# Patient Record
Sex: MALE | Race: WHITE | NOT HISPANIC OR LATINO | Employment: OTHER | RURAL
[De-identification: names, ages, dates, MRNs, and addresses within clinical notes are randomized per-mention and may not be internally consistent; named-entity substitution may affect disease eponyms.]

---

## 2023-06-13 RX ORDER — ATORVASTATIN CALCIUM 80 MG/1
TABLET, FILM COATED ORAL
Qty: 90 TABLET | OUTPATIENT
Start: 2023-06-13

## 2023-11-02 ENCOUNTER — OFFICE VISIT (OUTPATIENT)
Dept: CARDIOLOGY | Facility: CLINIC | Age: 68
End: 2023-11-02
Payer: MEDICARE

## 2023-11-02 VITALS
OXYGEN SATURATION: 95 % | WEIGHT: 225 LBS | HEIGHT: 69 IN | HEART RATE: 61 BPM | SYSTOLIC BLOOD PRESSURE: 124 MMHG | DIASTOLIC BLOOD PRESSURE: 82 MMHG | BODY MASS INDEX: 33.33 KG/M2

## 2023-11-02 DIAGNOSIS — R06.02 SHORTNESS OF BREATH: Primary | ICD-10-CM

## 2023-11-02 DIAGNOSIS — I10 ESSENTIAL HYPERTENSION: ICD-10-CM

## 2023-11-02 DIAGNOSIS — R91.8 LUNG NODULES: ICD-10-CM

## 2023-11-02 DIAGNOSIS — E78.5 HYPERLIPIDEMIA LDL GOAL <100: ICD-10-CM

## 2023-11-02 DIAGNOSIS — I51.7 MILD CONCENTRIC LEFT VENTRICULAR HYPERTROPHY (LVH): ICD-10-CM

## 2023-11-02 RX ORDER — ASPIRIN 81 MG/1
TABLET ORAL
COMMUNITY

## 2023-11-02 RX ORDER — PANTOPRAZOLE SODIUM 40 MG/1
TABLET, DELAYED RELEASE ORAL
COMMUNITY

## 2023-11-02 RX ORDER — CLOBETASOL PROPIONATE 0.5 MG/G
OINTMENT TOPICAL
COMMUNITY

## 2023-11-02 RX ORDER — METOPROLOL SUCCINATE AND HYDROCHLOROTHIAZIDE 12.5; 5 MG/1; MG/1
TABLET ORAL
COMMUNITY
End: 2023-11-02

## 2023-11-02 RX ORDER — MELOXICAM 15 MG/1
TABLET ORAL
COMMUNITY
Start: 2023-01-11

## 2023-11-02 RX ORDER — FLUTICASONE FUROATE, UMECLIDINIUM BROMIDE AND VILANTEROL TRIFENATATE 100; 62.5; 25 UG/1; UG/1; UG/1
POWDER RESPIRATORY (INHALATION)
COMMUNITY
Start: 2023-10-20

## 2023-11-02 RX ORDER — TRIAMTERENE AND HYDROCHLOROTHIAZIDE 37.5; 25 MG/1; MG/1
TABLET ORAL
COMMUNITY

## 2023-11-02 RX ORDER — SERTRALINE HYDROCHLORIDE 100 MG/1
TABLET, FILM COATED ORAL
COMMUNITY

## 2023-11-02 RX ORDER — ATORVASTATIN CALCIUM 40 MG/1
TABLET, FILM COATED ORAL
COMMUNITY

## 2023-11-02 RX ORDER — TAMSULOSIN HYDROCHLORIDE 0.4 MG/1
1 CAPSULE ORAL DAILY
COMMUNITY

## 2023-11-02 RX ORDER — ALBUTEROL SULFATE 90 UG/1
AEROSOL, METERED RESPIRATORY (INHALATION)
COMMUNITY

## 2023-11-02 NOTE — ASSESSMENT & PLAN NOTE
He describes shortness of breath only with exertional activity.  Exertional activities such as running after his dog or when ambulating up stairs greater than 1 flight of stairs.    He reports occasionally he will wheeze as he has a coexisting respiratory condition that he uses inhalers.    He reports that he is able to walk through the grocery store, push the buggy, load and unload his groceries and not feel short of air.  He reports that he is able to carry about a 10 to 15 pound weedeater, walk the edges of his yard and weed eat slow and steady for 15 or 20 minutes and not feel short of breath.    Suspect shortness of air may be several reasons that are contributing.  Discussed that he is carrying extra weight, that he is a bit deconditioned and has a coexisting respiratory condition that he uses inhalers.    Plan:  -Check an EKG  -Check an echocardiogram  -Check CT of the chest with contrast  -He is encouraged to slightly and slowly to increase his activity level.  He has a stationary bike that he uses in the past but has not been using regular.  He had taken the dog for walks several times a day in the past not been doing this as much lately.  So he is encouraged to slowly and slightly increase his activity and we will follow his symptoms.

## 2023-11-02 NOTE — PATIENT INSTRUCTIONS
Exercising to Lose Weight  Getting regular exercise is important for everyone. It is especially important if you are overweight. Being overweight increases your risk of heart disease, stroke, diabetes, high blood pressure, and several types of cancer. Exercising, and reducing the calories you consume, can help you lose weight and improve fitness and health.  Exercise can be moderate or vigorous intensity. To lose weight, most people need to do a certain amount of moderate or vigorous-intensity exercise each week.  How can exercise affect me?  You lose weight when you exercise enough to burn more calories than you eat. Exercise also reduces body fat and builds muscle. The more muscle you have, the more calories you burn. Exercise also:  Improves mood.  Reduces stress and tension.  Improves your overall fitness, flexibility, and endurance.  Increases bone strength.  Moderate-intensity exercise  A person riding a bicycle wearing a safety helmet.      Moderate-intensity exercise is any activity that gets you moving enough to burn at least three times more energy (calories) than if you were sitting.  Examples of moderate exercise include:  Walking a mile in 15 minutes.  Doing light yard work.  Biking at an easy pace.  Most people should get at least 150 minutes of moderate-intensity exercise a week to maintain their body weight.  Vigorous-intensity exercise  Vigorous-intensity exercise is any activity that gets you moving enough to burn at least six times more calories than if you were sitting. When you exercise at this intensity, you should be working hard enough that you are not able to carry on a conversation.  Examples of vigorous exercise include:  Running.  Playing a team sport, such as football, basketball, and soccer.  Jumping rope.  Most people should get at least 75 minutes a week of vigorous exercise to maintain their body weight.  What actions can I take to lose weight?  The amount of exercise you need to  lose weight depends on:  Your age.  The type of exercise.  Any health conditions you have.  Your overall physical ability.  Talk to your health care provider about how much exercise you need and what types of activities are safe for you.  Nutrition  A plate with healthy, colorful foods.      Make changes to your diet as told by your health care provider or diet and nutrition specialist (dietitian). This may include:  Eating fewer calories.  Eating more protein.  Eating less unhealthy fats.  Eating a diet that includes fresh fruits and vegetables, whole grains, low-fat dairy products, and lean protein.  Avoiding foods with added fat, salt, and sugar.  Drink plenty of water while you exercise to prevent dehydration or heat stroke.  Activity  Choose an activity that you enjoy and set realistic goals. Your health care provider can help you make an exercise plan that works for you.  Exercise at a moderate or vigorous intensity most days of the week.  The intensity of exercise may vary from person to person. You can tell how intense a workout is for you by paying attention to your breathing and heartbeat. Most people will notice their breathing and heartbeat get faster with more intense exercise.  Do resistance training twice each week, such as:  Push-ups.  Sit-ups.  Lifting weights.  Using resistance bands.  Getting short amounts of exercise can be just as helpful as long, structured periods of exercise. If you have trouble finding time to exercise, try doing these things as part of your daily routine:  Get up, stretch, and walk around every 30 minutes throughout the day.  Go for a walk during your lunch break.  Park your car farther away from your destination.  If you take public transportation, get off one stop early and walk the rest of the way.  Make phone calls while standing up and walking around.  Take the stairs instead of elevators or escalators.  Wear comfortable clothes and shoes with good support.  Do not  exercise so much that you hurt yourself, feel dizzy, or get very short of breath.  Where to find more information  [place] Department of Health and Human Services: www.hhs.gov  Centers for Disease Control and Prevention: www.cdc.gov  Contact a health care provider:  Before starting a new exercise program.  If you have questions or concerns about your weight.  If you have a medical problem that keeps you from exercising.  Get help right away if:  You have any of the following while exercising:  Injury.  Dizziness.  Difficulty breathing or shortness of breath that does not go away when you stop exercising.  Chest pain.  Rapid heartbeat.  These symptoms may represent a serious problem that is an emergency. Do not wait to see if the symptoms will go away. Get medical help right away. Call your local emergency services (911 in the [place] Do not drive yourself to the hospital.  Summary  Getting regular exercise is especially important if you are overweight.  Being overweight increases your risk of heart disease, stroke, diabetes, high blood pressure, and several types of cancer.  Losing weight happens when you burn more calories than you eat.  Reducing the amount of calories you eat, and getting regular moderate or vigorous exercise each week, helps you lose weight.  This information is not intended to replace advice given to you by your health care provider. Make sure you discuss any questions you have with your health care provider.  Document Revised: 02/13/2022 Document Reviewed: 02/13/2022  Elsevier Patient Education © 2022 [employer]    Healthy Eating  Following a healthy eating pattern may help you to achieve and maintain a healthy body weight, reduce the risk of chronic disease, and live a long and productive life. It is important to follow a healthy eating pattern at an appropriate calorie level for your body. Your nutritional needs should be met primarily through food by choosing a variety of nutrient-rich  "foods.  What are tips for following this plan?  Reading food labels  Read labels and choose the following:  Reduced or low sodium.  Juices with 100% fruit juice.  Foods with low saturated fats and high polyunsaturated and monounsaturated fats.  Foods with whole grains, such as whole wheat, cracked wheat, brown rice, and wild rice.  Whole grains that are fortified with folic acid. This is recommended for women who are pregnant or who want to become pregnant.  Read labels and avoid the following:  Foods with a lot of added sugars. These include foods that contain brown sugar, corn sweetener, corn syrup, dextrose, fructose, glucose, high-fructose corn syrup, honey, invert sugar, lactose, malt syrup, maltose, molasses, raw sugar, sucrose, trehalose, or turbinado sugar.  Do not eat more than the following amounts of added sugar per day:  6 teaspoons (25 g) for women.  9 teaspoons (38 g) for men.  Foods that contain processed or refined starches and grains.  Refined grain products, such as white flour, degermed cornmeal, white bread, and white rice.  Shopping  Choose nutrient-rich snacks, such as vegetables, whole fruits, and nuts. Avoid high-calorie and high-sugar snacks, such as potato chips, fruit snacks, and candy.  Use oil-based dressings and spreads on foods instead of solid fats such as butter, stick margarine, or cream cheese.  Limit pre-made sauces, mixes, and \"instant\" products such as flavored rice, instant noodles, and ready-made pasta.  Try more plant-protein sources, such as tofu, tempeh, black beans, edamame, lentils, nuts, and seeds.  Explore eating plans such as the Mediterranean diet or vegetarian diet.  Cooking  Use oil to sauté or stir-[place] foods instead of solid fats such as butter, stick margarine, or lard.  Try baking, boiling, grilling, or broiling instead of frying.  Remove the fatty part of meats before cooking.  Steam vegetables in water or broth.  Meal planning  A plate with examples of foods in " a healthy diet.      At meals, imagine dividing your plate into fourths:  One-half of your plate is fruits and vegetables.  One-fourth of your plate is whole grains.  One-fourth of your plate is protein, especially lean meats, poultry, eggs, tofu, beans, or nuts.  Include low-fat dairy as part of your daily diet.  Lifestyle  Choose healthy options in all settings, including home, work, school, restaurants, or stores.  Prepare your food safely:  Wash your hands after handling raw meats.  Keep food preparation surfaces clean by regularly washing with hot, soapy water.  Keep raw meats separate from ready-to-eat foods, such as fruits and vegetables.  , meat, poultry, and eggs to the recommended internal temperature.  Store foods at safe temperatures. In general:  Keep cold foods at 40°F (4.4°C) or below.  Keep hot foods at 140°F (60°C) or above.  Keep your freezer at 0°F (-17.8°C) or below.  Foods are no longer safe to eat when they have been between the temperatures of 40°-140°F (4.4-60°C) for more than 2 hours.  What foods should I eat?  Fruits  Aim to eat 2 cup-equivalents of fresh, canned (in natural juice), or frozen fruits each day. Examples of 1 cup-equivalent of fruit include 1 small apple, 8 large strawberries, 1 cup canned fruit, ½ cup dried fruit, or 1 cup 100% juice.  Vegetables  Aim to eat 2½-3 cup-equivalents of fresh and frozen vegetables each day, including different varieties and colors. Examples of 1 cup-equivalent of vegetables include 2 medium carrots, 2 cups raw, leafy greens, 1 cup chopped vegetable (raw or cooked), or 1 medium baked potato.  Grains  Aim to eat 6 ounce-equivalents of whole grains each day. Examples of 1 ounce-equivalent of grains include 1 slice of bread, 1 cup ready-to-eat cereal, 3 cups popcorn, or ½ cup cooked rice, pasta, or cereal.  Meats and other proteins  Aim to eat 5-6 ounce-equivalents of protein each day. Examples of 1 ounce-equivalent of protein include 1  egg, 1/2 cup nuts or seeds, or 1 tablespoon (16 g) peanut butter. A cut of meat or fish that is the size of a deck of cards is about 3-4 ounce-equivalents.  Of the protein you eat each week, try to have at least 8 ounces come from seafood. This includes salmon, trout, herring, and anchovies.  Dairy  Aim to eat 3 cup-equivalents of fat-free or low-fat dairy each day. Examples of 1 cup-equivalent of dairy include 1 cup (240 mL) milk, 8 ounces (250 g) yogurt, 1½ ounces (44 g) natural cheese, or 1 cup (240 mL) fortified soy milk.  Fats and oils  Aim for about 5 teaspoons (21 g) per day. Choose monounsaturated fats, such as canola and olive oils, avocados, peanut butter, and most nuts, or polyunsaturated fats, such as sunflower, corn, and soybean oils, walnuts, pine nuts, sesame seeds, sunflower seeds, and flaxseed.  Beverages  Aim for six 8-oz glasses of water per day. Limit coffee to three to five 8-oz cups per day.  Limit caffeinated beverages that have added calories, such as soda and energy drinks.  Limit alcohol intake to no more than 1 drink a day for nonpregnant women and 2 drinks a day for men. One drink equals 12 oz of beer (355 mL), 5 oz of wine (148 mL), or 1½ oz of hard liquor (44 mL).  Seasoning and other foods  Avoid adding excess amounts of salt to your foods. Try flavoring foods with herbs and spices instead of salt.  Avoid adding sugar to foods.  Try using oil-based dressings, sauces, and spreads instead of solid fats.  This information is based on general [place] nutrition guidelines. For more information, visit choosemyplate.gov. Exact amounts may vary based on your nutrition needs.  Summary  A healthy eating plan may help you to maintain a healthy weight, reduce the risk of chronic diseases, and stay active throughout your life.  Plan your meals. Make sure you eat the right portions of a variety of nutrient-rich foods.  Try baking, boiling, grilling, or broiling instead of frying.  Choose healthy  options in all settings, including home, work, school, restaurants, or stores.  This information is not intended to replace advice given to you by your health care provider. Make sure you discuss any questions you have with your health care provider.  Document Revised: 08/16/2022 Document Reviewed: 08/16/2022  Elsevier Patient Education © 2022 [employer]

## 2023-11-02 NOTE — ASSESSMENT & PLAN NOTE
He reports he was out of his cholesterol medications x2 months.    He completed labs in August 2023 showing his  triglycerides elevated.    He restarted statin in September.    Plan 3-month follow-up of lipids now on statin therapy.    Also discussed healthy diet for his triglycerides.  He is encouraged to lower his sugar and carbohydrate intake.

## 2023-11-02 NOTE — ASSESSMENT & PLAN NOTE
CT of the chest April 21, 2021 with lung nodules.  This was a stable follow-up CT of the chest.    He reports today he has had no follow-up since 2021.  He was due for annual follow-up on this.    Plan CT of the chest with contrast

## 2023-11-02 NOTE — ASSESSMENT & PLAN NOTE
Blood pressure is well controlled.    He does check his blood pressure at home with a wrist cuff.  He reports he normally sees blood pressures of systolic blood pressure 120-130.    He is on metoprolol 50 mg daily-noted heart rate 54 on EKG.  He is encouraged to check heart rate when he is up and active to make sure that is going up into the 70s and 80s.    He is also on triamterene hydrochlorothiazide.  37.5 to 25 mg daily.    Plan to continue this regimen.  May consider lowering his metoprolol dose depending on heart rate with exercise.

## 2023-11-02 NOTE — PROGRESS NOTES
[facility] Provider Note     Date:   2023   Name: Stalin Diaz  :   1955  PCP: Tan Payne MD    Chief Complaint   Patient presents with   • Coronary Artery Disease       Subjective     History of Present Illness  Stalin Diaz is a 68 y.o. male who presents today for 2-1/2-year follow-up.  He has been seen here in the past for known history of lung nodules, hypertension and hyperlipidemia.  He has had suspected CAD but has never completed a heart cath.    At today's visit he reports that overall he has been doing well and feeling well.  He reports that he did run out of his cholesterol medication and that his prompted him to make this follow-up visit.  He did see his family physician about 2 months ago completed his annual checkup and lab work there.    He reports that he is active as he walks his dog, mows his yard and uses his weedeater.  He reports if he ambulates up 1 set of stairs that he will be winded at the top but he reports his knees will hurt more than he will be short of air.  He reports that if he has to run after his dog then he will be short of air.  He reports if he has to ambulate up he will or upstairs he will have shortness of air.He reports he can use his weedeater and normally is not winded.    History of tenontitis and vertigo and this is related to his dizziness. Not changed and not new.    Reports Denies   Chest Pain [] [x]   Shortness of Air [x] []   Palpitations [] [x]   Edema [] [x]   Dizziness [x] []   Syncope [] [x]       No Known Allergies    Current Outpatient Medications:   •  albuterol sulfate HFA (ProAir HFA) 108 (90 Base) MCG/ACT inhaler, Inhale 2 puffs every 4 hours by inhalation route., Disp: , Rfl:   •  aspirin (ASPIR) 81 MG EC tablet, , Disp: , Rfl:   •  atorvastatin (LIPITOR) 40 MG tablet, Take 1 tablet every day by oral route., Disp: , Rfl:   •  clobetasol (Temovate) 0.05 % ointment, , Disp: , Rfl:   •  meloxicam (MOBIC)  "15 MG tablet, , Disp: , Rfl:   •  metoprolol succinate XL (TOPROL-XL) 50 MG 24 hr tablet, Take 1 tablet by mouth Daily., Disp: , Rfl:   •  pantoprazole (PROTONIX) 40 MG EC tablet, , Disp: , Rfl:   •  sertraline (ZOLOFT) 100 MG tablet, , Disp: , Rfl:   •  tamsulosin (FLOMAX) 0.4 MG capsule 24 hr capsule, Take 1 capsule by mouth Daily., Disp: , Rfl:   •  Trelegy Ellipta 100-62.5-25 MCG/ACT inhaler, , Disp: , Rfl:   •  triamterene-hydrochlorothiazide (MAXZIDE-25) 37.5-25 MG per tablet, , Disp: , Rfl:     Past Medical History:   Diagnosis Date   • Carotid artery stenosis    • Chronic kidney disease    • Coronary artery disease    • Hyperlipidemia    • Hypertension    • Retinal detachment    • SVT (supraventricular tachycardia)    • Thoracic aortic aneurysm       Past Surgical History:   Procedure Laterality Date   • CATARACT EXTRACTION     • KNEE SURGERY     • RETINAL DETACHMENT SURGERY       Family History   Problem Relation Age of Onset   • Heart attack Mother    • Lung cancer Mother    • Kidney failure Father    • Crohn's disease Sister    • Heart attack Brother      Social History     Socioeconomic History   • Marital status: Single   Tobacco Use   • Smoking status: Every Day     Packs/day: 1     Types: Cigarettes     Passive exposure: Current   • Smokeless tobacco: Never   Vaping Use   • Vaping Use: Never used   Substance and Sexual Activity   • Alcohol use: Not Currently   • Drug use: Never   • Sexual activity: Defer       Objective     Vital Signs:  /82   Pulse 61   Ht 175.3 cm (69\")   Wt 102 kg (225 lb)   SpO2 95%   BMI 33.23 kg/m²   Estimated body mass index is 33.23 kg/m² as calculated from the following:    Height as of this encounter: 175.3 cm (69\").    Weight as of this encounter: 102 kg (225 lb).       BMI is >= 30 and <35. (Class 1 Obesity). The following options were offered after discussion;: Information on healthy weight added to patient's after visit summary.      Physical " Exam  Constitutional:       Appearance: Normal appearance. He is well-developed.   HENT:      Head: Normocephalic and atraumatic.      Nose: Nose normal.      Mouth/Throat:      Mouth: Mucous membranes are moist.   Eyes:      General: No scleral icterus.     Pupils: Pupils are equal, round, and reactive to light.   Neck:      Vascular: No carotid bruit.   Cardiovascular:      Rate and Rhythm: Normal rate and regular rhythm.      Pulses: Normal pulses.           Radial pulses are 2+ on the right side and 2+ on the left side.        Dorsalis pedis pulses are 2+ on the right side and 2+ on the left side.        Posterior tibial pulses are 2+ on the right side and 2+ on the left side.      Heart sounds: Normal heart sounds. No murmur heard.  Pulmonary:      Effort: Pulmonary effort is normal.      Breath sounds: Normal breath sounds. No wheezing or rhonchi.   Abdominal:      General: Bowel sounds are normal.   Musculoskeletal:      Right lower leg: No edema.      Left lower leg: No edema.   Skin:     General: Skin is warm and dry.      Capillary Refill: Capillary refill takes less than 2 seconds.      Coloration: Skin is not cyanotic.      Nails: There is no clubbing.   Neurological:      Mental Status: He is alert and oriented to person, place, and time.      Motor: No weakness.      Gait: Gait normal.   Psychiatric:         Mood and Affect: Mood normal.         Behavior: Behavior normal. Behavior is cooperative.         Thought Content: Thought content normal.         Cognition and Memory: Memory normal.           Labs reviewed: 8/23/2023 CBC, CMP, TSH, hemoglobin A1c, lipids, PSA and 8/23/2023 office note, Medicare annual wellness visit with family physician Dr. Payne.      ECG 12 Lead    Date/Time: 11/2/2023 11:19 AM  Performed by: Irais Marion APRN    Authorized by: Irais Marion APRN  Comparison: compared with previous ECG from 5/13/2020  Similar to previous ECG  Rhythm: sinus bradycardia  Rate:  bradycardic  BPM: 54  Conduction: non-specific intraventricular conduction delay  Other findings: low voltage and left ventricular hypertrophy    Clinical impression: non-specific ECG           Assessment and Plan     Diagnoses and all orders for this visit:    1. Shortness of breath (Primary)  Assessment & Plan:  He describes shortness of breath only with exertional activity.  Exertional activities such as running after his dog or when ambulating up stairs greater than 1 flight of stairs.    He reports occasionally he will wheeze as he has a coexisting respiratory condition that he uses inhalers.    He reports that he is able to walk through the grocery store, push the buggy, load and unload his groceries and not feel short of air.  He reports that he is able to carry about a 10 to 15 pound weedeater, walk the edges of his yard and weed eat slow and steady for 15 or 20 minutes and not feel short of breath.    Suspect shortness of air may be several reasons that are contributing.  Discussed that he is carrying extra weight, that he is a bit deconditioned and has a coexisting respiratory condition that he uses inhalers.    Plan:  -Check an EKG  -Check an echocardiogram  -Check CT of the chest with contrast  -He is encouraged to slightly and slowly to increase his activity level.  He has a stationary bike that he uses in the past but has not been using regular.  He had taken the dog for walks several times a day in the past not been doing this as much lately.  So he is encouraged to slowly and slightly increase his activity and we will follow his symptoms.    Orders:  -     Adult Transthoracic Echo Complete W/ Cont if Necessary Per Protocol; Future    2. Lung nodules  Assessment & Plan:  CT of the chest April 21, 2021 with lung nodules.  This was a stable follow-up CT of the chest.    He reports today he has had no follow-up since 2021.  He was due for annual follow-up on this.    Plan CT of the chest with  contrast    Orders:  -     CT Chest With Contrast Diagnostic; Future    3. Hyperlipidemia LDL goal <100  Assessment & Plan:  He reports he was out of his cholesterol medications x2 months.    He completed labs in August 2023 showing his  triglycerides elevated.    He restarted statin in September.    Plan 3-month follow-up of lipids now on statin therapy.    Also discussed healthy diet for his triglycerides.  He is encouraged to lower his sugar and carbohydrate intake.    Orders:  -     Lipid Panel; Future    4. Essential hypertension  Assessment & Plan:  Blood pressure is well controlled.    He does check his blood pressure at home with a wrist cuff.  He reports he normally sees blood pressures of systolic blood pressure 120-130.    He is on metoprolol 50 mg daily-noted heart rate 54 on EKG.  He is encouraged to check heart rate when he is up and active to make sure that is going up into the 70s and 80s.    He is also on triamterene hydrochlorothiazide.  37.5 to 25 mg daily.    Plan to continue this regimen.  May consider lowering his metoprolol dose depending on heart rate with exercise.    Orders:  -     Basic Metabolic Panel; Future  -     ECG 12 Lead    5. Mild concentric left ventricular hypertrophy (LVH)  -     Adult Transthoracic Echo Complete W/ Cont if Necessary Per Protocol; Future        Recommendations: Report if any new/changing symptoms immediately, Exercise recommendations discussed, and healthy diet including lowering sugars and lowering carbohydrates discussed today.    Stalin Diaz  reports that he has been smoking cigarettes. He has been smoking an average of 1 pack per day. He has been exposed to tobacco smoke. He has never used smokeless tobacco.. I have educated him on the risk of diseases from using tobacco products such as cancer, COPD, and heart disease.     I advised him to quit and he is not willing to quit.    I spent 3  minutes counseling the patient.           Follow  Up  Return in about 6 weeks (around 12/14/2023) for CT of chest results and lab results .  Patient was given instructions and counseling regarding his condition or for health maintenance advice. Please see specific information pulled into the AVS if appropriate.

## 2023-11-10 DIAGNOSIS — R91.8 LUNG NODULES: ICD-10-CM

## 2023-12-14 ENCOUNTER — OFFICE VISIT (OUTPATIENT)
Dept: CARDIOLOGY | Facility: CLINIC | Age: 68
End: 2023-12-14
Payer: MEDICARE

## 2023-12-14 VITALS
HEART RATE: 80 BPM | SYSTOLIC BLOOD PRESSURE: 124 MMHG | BODY MASS INDEX: 32.44 KG/M2 | DIASTOLIC BLOOD PRESSURE: 68 MMHG | OXYGEN SATURATION: 95 % | WEIGHT: 219 LBS | HEIGHT: 69 IN

## 2023-12-14 DIAGNOSIS — I51.7 MILD CONCENTRIC LEFT VENTRICULAR HYPERTROPHY (LVH): Primary | ICD-10-CM

## 2023-12-14 DIAGNOSIS — R06.02 SHORTNESS OF BREATH: ICD-10-CM

## 2023-12-14 DIAGNOSIS — I10 ESSENTIAL HYPERTENSION: ICD-10-CM

## 2023-12-14 DIAGNOSIS — R91.8 LUNG NODULES: ICD-10-CM

## 2023-12-14 DIAGNOSIS — E78.5 HYPERLIPIDEMIA LDL GOAL <100: ICD-10-CM

## 2023-12-14 RX ORDER — FINASTERIDE 5 MG/1
1 TABLET, FILM COATED ORAL DAILY
COMMUNITY
Start: 2023-12-07

## 2023-12-14 NOTE — ASSESSMENT & PLAN NOTE
Patient reports he has shortness of breath when climbing stairs or when he has to rambo after his dog.  Echo shows normal heart function and normal right ventricular pressures, left ventricular diastolic function is consistent with grade 1 impaired relaxation.  Will continue triamterene-hydrochlorothiazide.

## 2023-12-14 NOTE — ASSESSMENT & PLAN NOTE
Blood pressure is well-controlled on current medications.   Will continue current medications.   Limit salt intake.    And increase exercise as tolerated

## 2023-12-14 NOTE — ASSESSMENT & PLAN NOTE
Patient has since restarted his atorvastatin labs were reviewed from 12/14/2023 which showed improved cholesterol at 161, LDL 72 ,HDL 35, and triglycerides 161.

## 2023-12-14 NOTE — PROGRESS NOTES
[facility] Provider Note     Date:   2023  Name: Stalin Diaz  :   1955  PCP: Tan Payne MD    Chief Complaint   Patient presents with    Shortness of Breath       Subjective     History of Present Illness  Stalin Diaz is a 68 y.o. male who presents today for SOA.    Patient is here today for follow-up on shortness of air and echo results.  He states he he has noticed he is a little more winded when he runs after the dog or goes up a flight of stairs.  He had recent CT of chest which showed stable pulmonary nodule.  Echo results has been reviewed with patient as described below.    Labs were reviewed from this morning his BUN and creatinine 19/1.4, cholesterol is 139, triglycerides 161, HDL 35, and LDL 72.  He says this is improved since he has started back on his atorvastatin 40 mg daily.      Cardiac history:    Stress testing that was low risk    20 ECHO  LV EF 55-60%.  Normal LV size.  Mild LVH.  Mild Aortic valve sclerosis without stenosis. Trace AR. Mild MR.  Diastolic function is abnormal.  Right ventricular systolic pressure is normal.  Trace CT.  Aortic root is dilated, limited to the  sinuses of valsalva measuring up to 39mm.    Coexisting tobacco use, hyperlipidemia and hypertension.    History of lung nodule Recent CT of Chest shows stable pulmonay nodule.    23 ECHO ·  Left ventricular systolic function is normal. Left ventricular ejection fraction appears to be 56 - 60%.  ·  The left ventricular cavity is borderline dilated.  ·  Left ventricular diastolic function is consistent with (grade I) impaired relaxation.  ·  The right ventricular cavity is borderline dilated.  ·  The left atrial cavity is mildly dilated.  ·  There is mild calcification of the aortic valve mainly affecting the non-coronary and left coronary cusp(s).  ·  Estimated right ventricular systolic pressure from tricuspid regurgitation is normal (<35 mmHg).  ·  Mild  dilation of the aortic root is present. Mild dilation of the sinuses of Valsalva is present, 3.9 cm.    History of tenontitis and vertigo and this is related to his dizziness. Not changed and not new.     Reports Denies   Chest Pain [] [x]   Shortness of Air [x] []   Palpitations [] [x]   Edema [] [x]   Dizziness [x] []   Syncope [] [x]           No Known Allergies    Current Outpatient Medications:     aspirin (ASPIR) 81 MG EC tablet, , Disp: , Rfl:     atorvastatin (LIPITOR) 40 MG tablet, Take 1 tablet every day by oral route., Disp: , Rfl:     finasteride (PROSCAR) 5 MG tablet, Take 1 tablet by mouth Daily., Disp: , Rfl:     meloxicam (MOBIC) 15 MG tablet, , Disp: , Rfl:     metoprolol succinate XL (TOPROL-XL) 50 MG 24 hr tablet, Take 1 tablet by mouth Daily., Disp: , Rfl:     pantoprazole (PROTONIX) 40 MG EC tablet, , Disp: , Rfl:     sertraline (ZOLOFT) 100 MG tablet, , Disp: , Rfl:     tamsulosin (FLOMAX) 0.4 MG capsule 24 hr capsule, Take 1 capsule by mouth Daily., Disp: , Rfl:     Trelegy Ellipta 100-62.5-25 MCG/ACT inhaler, , Disp: , Rfl:     triamterene-hydrochlorothiazide (MAXZIDE-25) 37.5-25 MG per tablet, , Disp: , Rfl:     Past Medical History:   Diagnosis Date    Carotid artery stenosis     Chronic kidney disease     Coronary artery disease     Hyperlipidemia     Hypertension     Retinal detachment     SVT (supraventricular tachycardia)     Thoracic aortic aneurysm       Past Surgical History:   Procedure Laterality Date    CATARACT EXTRACTION      KNEE SURGERY      RETINAL DETACHMENT SURGERY       Family History   Problem Relation Age of Onset    Heart attack Mother     Lung cancer Mother     Kidney failure Father     Crohn's disease Sister     Heart attack Brother      Social History     Socioeconomic History    Marital status: Single   Tobacco Use    Smoking status: Every Day     Packs/day: 1     Types: Cigarettes     Passive exposure: Current    Smokeless tobacco: Never   Vaping Use    Vaping Use:  "Never used   Substance and Sexual Activity    Alcohol use: Not Currently    Drug use: Never    Sexual activity: Defer       Objective     Vital Signs:  /68   Pulse 80   Ht 175.3 cm (69\")   Wt 99.3 kg (219 lb)   SpO2 95%   BMI 32.34 kg/m²   Estimated body mass index is 32.34 kg/m² as calculated from the following:    Height as of this encounter: 175.3 cm (69\").    Weight as of this encounter: 99.3 kg (219 lb).               Physical Exam  Constitutional:       Appearance: Normal appearance.   Cardiovascular:      Rate and Rhythm: Normal rate and regular rhythm.      Pulses: Normal pulses.      Heart sounds: Murmur heard.   Pulmonary:      Effort: Pulmonary effort is normal.      Breath sounds: Normal breath sounds and air entry.   Musculoskeletal:      Right lower leg: No edema.      Left lower leg: No edema.   Skin:     General: Skin is warm and dry.   Neurological:      General: No focal deficit present.      Mental Status: He is alert and oriented to person, place, and time.   Psychiatric:         Mood and Affect: Mood normal.         Behavior: Behavior normal.         Thought Content: Thought content normal.         The following data was reviewed by: KYRA Lima on 12/14/2023:    Labs and ECHO from 12/14/23.          Assessment and Plan     Diagnoses and all orders for this visit:    1. Mild concentric left ventricular hypertrophy (LVH) (Primary)  Assessment & Plan:  Echo reviewed from today shows borderline LVH.      2. Essential hypertension  Assessment & Plan:  Blood pressure is well-controlled on current medications.   Will continue current medications.   Limit salt intake.    And increase exercise as tolerated    Orders:  -     Basic Metabolic Panel    3. Hyperlipidemia LDL goal <100  Assessment & Plan:  Patient has since restarted his atorvastatin labs were reviewed from 12/14/2023 which showed improved cholesterol at 161, LDL 72 ,HDL 35, and triglycerides 161.    Orders:  -     Lipid " Panel    4. Shortness of breath  Assessment & Plan:  Patient reports he has shortness of breath when climbing stairs or when he has to rambo after his dog.  Echo shows normal heart function and normal right ventricular pressures, left ventricular diastolic function is consistent with grade 1 impaired relaxation.  Will continue triamterene-hydrochlorothiazide.      5. Lung nodules  Assessment & Plan:  Recent CT in November 2023 shows stable pulmonary nodule.          Recommendations: ER if symptoms increase and Report if any new/changing symptoms immediately          Follow Up  Return in about 1 year (around 12/14/2024) for Next scheduled follow up.  Patient was given instructions and counseling regarding his condition or for health maintenance advice. Please see specific information pulled into the AVS if appropriate.

## 2023-12-19 ENCOUNTER — TELEPHONE (OUTPATIENT)
Dept: CARDIOLOGY | Facility: CLINIC | Age: 68
End: 2023-12-19
Payer: MEDICARE

## 2023-12-19 NOTE — TELEPHONE ENCOUNTER
----- Message from Dinora Meyer MA sent at 12/19/2023  3:42 PM EST -----  Called patient, no answer. Left VM to call back.

## 2024-12-12 ENCOUNTER — OFFICE VISIT (OUTPATIENT)
Dept: CARDIOLOGY | Facility: CLINIC | Age: 69
End: 2024-12-12
Payer: MEDICARE

## 2024-12-12 VITALS
HEART RATE: 96 BPM | HEIGHT: 69 IN | BODY MASS INDEX: 33.62 KG/M2 | WEIGHT: 227 LBS | SYSTOLIC BLOOD PRESSURE: 138 MMHG | DIASTOLIC BLOOD PRESSURE: 82 MMHG | OXYGEN SATURATION: 95 %

## 2024-12-12 DIAGNOSIS — R06.02 SHORTNESS OF BREATH: Primary | ICD-10-CM

## 2024-12-12 DIAGNOSIS — I10 ESSENTIAL HYPERTENSION: ICD-10-CM

## 2024-12-12 DIAGNOSIS — E78.5 HYPERLIPIDEMIA LDL GOAL <100: ICD-10-CM

## 2024-12-12 PROCEDURE — 3079F DIAST BP 80-89 MM HG: CPT | Performed by: NURSE PRACTITIONER

## 2024-12-12 PROCEDURE — 3075F SYST BP GE 130 - 139MM HG: CPT | Performed by: NURSE PRACTITIONER

## 2024-12-12 PROCEDURE — 99214 OFFICE O/P EST MOD 30 MIN: CPT | Performed by: NURSE PRACTITIONER

## 2024-12-12 PROCEDURE — 93000 ELECTROCARDIOGRAM COMPLETE: CPT | Performed by: NURSE PRACTITIONER

## 2024-12-12 RX ORDER — TADALAFIL 5 MG/1
1 TABLET ORAL DAILY
COMMUNITY
Start: 2024-11-04

## 2024-12-12 RX ORDER — ATORVASTATIN CALCIUM 80 MG/1
80 TABLET, FILM COATED ORAL DAILY
COMMUNITY
Start: 2024-09-22

## 2024-12-12 RX ORDER — TRIAMTERENE AND HYDROCHLOROTHIAZIDE 37.5; 25 MG/1; MG/1
1 CAPSULE ORAL EVERY MORNING
COMMUNITY
Start: 2024-09-23

## 2024-12-12 NOTE — ASSESSMENT & PLAN NOTE
Patient reports that he will be winded when he push mows the yard, when he climbs stairs or when he chases after his dog.    He reports that this symptom has been persistent and possibly slightly increasing over the last 1 year.    Last echo shows heart function within normal limits.    CAD risk factors of hypertension and hyperlipidemia    Last stress testing was 2017    Plan:    Nuclear stress test for further evaluation and risk stratification for CAD.    He is unable to ambulate the treadmill due to a history of torn meniscus in the left knee and status post left knee surgery.    Plan follow-up after study to review the findings

## 2024-12-12 NOTE — PROGRESS NOTES
[facility] Provider Note     Date:   2024   Name: Stalin Diaz  :   1955  PCP: Tan Payne MD    Chief Complaint   Patient presents with   • Left ventricular hypertrophy     Pt here for annual follow up on LVH.  Last Echo .  Hx lung nodules with last CT .  Labs 24.  Denies chest pain, does have SOA maybe slightly worse with fatigue.       Subjective     History of Present Illness  Stalin Diaz is a 69 y.o. male who presents today for known history of LVH, hypertension and hyperlipidemia.  He reports that his shortness of air with exertional activity such as ambulating up stairs, chasing his dog and push mowing the yard has slightly increased.  He reports that he has fatigue.  He reports he can push mow the yard at a slow steady pace for about 30 minutes every 2 weeks.  He reports that he lets the dog out and place with the dog every day.    He denies chest pain.    He reports that he has been checking his blood pressure at home and the highest that he has seen is systolic blood pressure 141 time.  Reports his heart rate averages about 55.    Cardiac history:    Stress testing 2017 was low risk    ECHO 2023:  ·  Left ventricular systolic function is normal. Left ventricular ejection fraction appears to be 56 - 60%.  ·  Left ventricular diastolic function is consistent with (grade I) impaired relaxation.  ·  There is mild calcification of the aortic valve mainly affecting the non-coronary and left coronary cusp(s).  ·  Estimated right ventricular systolic pressure from tricuspid regurgitation is normal (<35 mmHg).  ·  Mild dilation of the aortic root is present. Mild dilation of the sinuses of Valsalva is present, 3.9 cm.      Coexisting:hyperlipidemia and hypertension.    History of lung nodule Recent CT of Chest;  2023: stable pulmonary nodule for > 2 years: no further follow up needed         Reports Denies   Chest Pain [] [x]    Shortness of Air [x] []   Palpitations [] [x]   Edema [] [x]   Dizziness [] [x]   Syncope [] [x]       No Known Allergies    Current Outpatient Medications:   •  aspirin (ASPIR) 81 MG EC tablet, , Disp: , Rfl:   •  atorvastatin (LIPITOR) 80 MG tablet, Take 1 tablet by mouth Daily., Disp: , Rfl:   •  finasteride (PROSCAR) 5 MG tablet, Take 1 tablet by mouth Daily., Disp: , Rfl:   •  meloxicam (MOBIC) 15 MG tablet, , Disp: , Rfl:   •  metoprolol succinate XL (TOPROL-XL) 50 MG 24 hr tablet, Take 1 tablet by mouth Daily., Disp: , Rfl:   •  pantoprazole (PROTONIX) 40 MG EC tablet, , Disp: , Rfl:   •  sertraline (ZOLOFT) 100 MG tablet, , Disp: , Rfl:   •  tadalafil (CIALIS) 5 MG tablet, Take 1 tablet by mouth Daily., Disp: , Rfl:   •  tamsulosin (FLOMAX) 0.4 MG capsule 24 hr capsule, Take 1 capsule by mouth Daily., Disp: , Rfl:   •  Trelegy Ellipta 100-62.5-25 MCG/ACT inhaler, , Disp: , Rfl:   •  triamterene-hydrochlorothiazide (DYAZIDE) 37.5-25 MG per capsule, Take 1 capsule by mouth Every Morning., Disp: , Rfl:     Past Medical History:   Diagnosis Date   • Carotid artery stenosis    • Chronic kidney disease    • Coronary artery disease    • Hyperlipidemia    • Hypertension    • Lung nodules    • Retinal detachment    • SVT (supraventricular tachycardia)    • Thoracic aortic aneurysm       Past Surgical History:   Procedure Laterality Date   • CATARACT EXTRACTION     • KNEE SURGERY     • RETINAL DETACHMENT SURGERY       Family History   Problem Relation Age of Onset   • Heart attack Mother    • Lung cancer Mother    • Kidney failure Father    • Crohn's disease Sister    • Heart attack Brother      Social History     Socioeconomic History   • Marital status:    • Number of children: 1   Tobacco Use   • Smoking status: Never     Passive exposure: Past   • Smokeless tobacco: Never   Vaping Use   • Vaping status: Never Used   Substance and Sexual Activity   • Alcohol use: Not Currently   • Drug use: Never   •  "Sexual activity: Defer       Objective     Vital Signs:  /82 (BP Location: Right arm, Patient Position: Sitting, Cuff Size: Adult)   Pulse 96   Ht 175.3 cm (69\")   Wt 103 kg (227 lb)   SpO2 95%   BMI 33.52 kg/m²   Estimated body mass index is 33.52 kg/m² as calculated from the following:    Height as of this encounter: 175.3 cm (69\").    Weight as of this encounter: 103 kg (227 lb).             Physical Exam  Constitutional:       Appearance: Normal appearance. He is well-developed.   HENT:      Head: Normocephalic and atraumatic.      Nose: Nose normal.      Mouth/Throat:      Mouth: Mucous membranes are moist.   Eyes:      General: No scleral icterus.     Pupils: Pupils are equal, round, and reactive to light.   Neck:      Vascular: No carotid bruit.   Cardiovascular:      Rate and Rhythm: Normal rate and regular rhythm.      Pulses: Normal pulses.           Radial pulses are 2+ on the right side and 2+ on the left side.        Dorsalis pedis pulses are 2+ on the right side and 2+ on the left side.        Posterior tibial pulses are 2+ on the right side and 2+ on the left side.      Heart sounds: Normal heart sounds. No murmur heard.  Pulmonary:      Effort: Pulmonary effort is normal.      Breath sounds: Normal breath sounds. No wheezing or rhonchi.   Abdominal:      General: Bowel sounds are normal.   Musculoskeletal:      Right lower leg: No edema.      Left lower leg: No edema.   Skin:     General: Skin is warm and dry.      Capillary Refill: Capillary refill takes less than 2 seconds.      Coloration: Skin is not cyanotic.      Nails: There is no clubbing.   Neurological:      Mental Status: He is alert and oriented to person, place, and time.      Motor: No weakness.      Gait: Gait normal.   Psychiatric:         Mood and Affect: Mood normal.         Behavior: Behavior normal. Behavior is cooperative.         Thought Content: Thought content normal.         Cognition and Memory: Memory normal. "           Labs reviewed: 8/26/2024 CBC PSA TSH CMP and lipid profile      ECG 12 Lead    Date/Time: 12/12/2024 6:31 PM  Performed by: Irais Marion APRN    Authorized by: Irais Marion APRN  Comparison: compared with previous ECG from 11/2/2023  Similar to previous ECG  Comparison to previous ECG: Sinus bradycardia heart rate 54 with conduction delay  Rhythm: sinus rhythm  Rate: normal  BPM: 61  Conduction: non-specific intraventricular conduction delay  ST Segments: ST segments normal  Other findings: T wave abnormality    Clinical impression: non-specific ECG           Assessment and Plan     Diagnoses and all orders for this visit:    1. Shortness of breath (Primary)  Assessment & Plan:  Patient reports that he will be winded when he push mows the yard, when he climbs stairs or when he chases after his dog.    He reports that this symptom has been persistent and possibly slightly increasing over the last 1 year.    Last echo shows heart function within normal limits.    CAD risk factors of hypertension and hyperlipidemia    Last stress testing was 2017    Plan:    Nuclear stress test for further evaluation and risk stratification for CAD.    He is unable to ambulate the treadmill due to a history of torn meniscus in the left knee and status post left knee surgery.    Plan follow-up after study to review the findings    Orders:  -     Stress Test With Myocardial Perfusion One Day; Future  -     ECG 12 Lead    2. Essential hypertension  Assessment & Plan:  Pressure today 138/82.  This is somewhat borderline control.    He is currently on:    Metoprolol succinate 50 mg daily  Triamterene hydrochlorothiazide 37.5/25 mg daily    Plan:   - recheck blood pressure at short follow-up visit  -Low-sodium diet    Noted weight is up about 8 pounds in 1 year.  He is advised weight loss.    Orders:  -     ECG 12 Lead    3. Hyperlipidemia LDL goal <100  Assessment & Plan:  Labs completed 8/26/2024 show  cholesterol levels:  -Total cholesterol 153  -HDL 41  -LDL 78  -Triglycerides 170    He is on atorvastatin 80 mg.    Plan:    Continue heart healthy diet    Exercise    Continue atorvastatin          Recommendations: Report if any new/changing symptoms immediately and Limitations of stress testing for definitive diagnosis reviewed          Follow Up  Return for Follow-Up after studies.  Patient was given instructions and counseling regarding his condition or for health maintenance advice. Please see specific information pulled into the AVS if appropriate.

## 2024-12-12 NOTE — ASSESSMENT & PLAN NOTE
Pressure today 138/82.  This is somewhat borderline control.    He is currently on:    Metoprolol succinate 50 mg daily  Triamterene hydrochlorothiazide 37.5/25 mg daily    Plan:   - recheck blood pressure at short follow-up visit  -Low-sodium diet    Noted weight is up about 8 pounds in 1 year.  He is advised weight loss.

## 2024-12-12 NOTE — ASSESSMENT & PLAN NOTE
Labs completed 8/26/2024 show cholesterol levels:  -Total cholesterol 153  -HDL 41  -LDL 78  -Triglycerides 170    He is on atorvastatin 80 mg.    Plan:    Continue heart healthy diet    Exercise    Continue atorvastatin

## 2024-12-13 ENCOUNTER — PATIENT ROUNDING (BHMG ONLY) (OUTPATIENT)
Dept: CARDIOLOGY | Facility: CLINIC | Age: 69
End: 2024-12-13
Payer: MEDICARE

## 2024-12-13 NOTE — PROGRESS NOTES
..My name is Rachel Lr and I am the Practice Manager for [facility] [place].    I would like to thank you for being a loyal patient. If you do not mind I would like to ask you a few questions about your recent visit with us.  Please feel free to reply if you wish to provide us with feedback on your first visit with our practice.    First, could you tell me what went well with your recent visit?    Secondly, we are always looking for ways to make our patients' experiences even better.  Do you have any recommendations on ways we may improve?    Finally, overall were you satisfied with your first visit to us as a [facility] facility?    In the next few days, you will be receiving a Patient Experience Survey.      Thank you for taking the time to answer a few questions today.  I hope you have a good day.

## 2024-12-17 ENCOUNTER — OUTSIDE FACILITY SERVICE (OUTPATIENT)
Dept: CARDIOLOGY | Facility: CLINIC | Age: 69
End: 2024-12-17
Payer: MEDICARE

## 2024-12-17 DIAGNOSIS — R06.02 SHORTNESS OF BREATH: ICD-10-CM

## 2024-12-19 ENCOUNTER — TELEPHONE (OUTPATIENT)
Age: 69
End: 2024-12-19
Payer: MEDICARE

## 2024-12-19 ENCOUNTER — OFFICE VISIT (OUTPATIENT)
Dept: CARDIOLOGY | Facility: CLINIC | Age: 69
End: 2024-12-19
Payer: MEDICARE

## 2024-12-19 VITALS
DIASTOLIC BLOOD PRESSURE: 72 MMHG | HEART RATE: 73 BPM | OXYGEN SATURATION: 96 % | WEIGHT: 230 LBS | SYSTOLIC BLOOD PRESSURE: 128 MMHG | BODY MASS INDEX: 34.07 KG/M2 | HEIGHT: 69 IN

## 2024-12-19 DIAGNOSIS — R94.39 ABNORMAL NUCLEAR STRESS TEST: Primary | ICD-10-CM

## 2024-12-19 DIAGNOSIS — I10 ESSENTIAL HYPERTENSION: ICD-10-CM

## 2024-12-19 DIAGNOSIS — R06.02 SHORTNESS OF BREATH: ICD-10-CM

## 2024-12-19 PROCEDURE — 3074F SYST BP LT 130 MM HG: CPT | Performed by: NURSE PRACTITIONER

## 2024-12-19 PROCEDURE — 3078F DIAST BP <80 MM HG: CPT | Performed by: NURSE PRACTITIONER

## 2024-12-19 RX ORDER — METOPROLOL TARTRATE 25 MG/1
200 TABLET, FILM COATED ORAL ONCE
OUTPATIENT
Start: 2024-12-19 | End: 2024-12-19

## 2024-12-19 RX ORDER — SODIUM CHLORIDE 0.9 % (FLUSH) 0.9 %
10 SYRINGE (ML) INJECTION EVERY 12 HOURS SCHEDULED
Status: CANCELLED | OUTPATIENT
Start: 2024-12-19

## 2024-12-19 RX ORDER — SODIUM CHLORIDE 0.9 % (FLUSH) 0.9 %
10 SYRINGE (ML) INJECTION AS NEEDED
Status: CANCELLED | OUTPATIENT
Start: 2024-12-19

## 2024-12-19 RX ORDER — METOPROLOL TARTRATE 25 MG/1
150 TABLET, FILM COATED ORAL ONCE
Status: CANCELLED | OUTPATIENT
Start: 2024-12-19

## 2024-12-19 RX ORDER — METOPROLOL TARTRATE 1 MG/ML
5 INJECTION, SOLUTION INTRAVENOUS
OUTPATIENT
Start: 2024-12-19

## 2024-12-19 RX ORDER — SODIUM CHLORIDE 0.9 % (FLUSH) 0.9 %
10 SYRINGE (ML) INJECTION AS NEEDED
OUTPATIENT
Start: 2024-12-19

## 2024-12-19 RX ORDER — METOPROLOL TARTRATE 25 MG/1
50 TABLET, FILM COATED ORAL ONCE
OUTPATIENT
Start: 2024-12-19

## 2024-12-19 RX ORDER — METOPROLOL TARTRATE 25 MG/1
150 TABLET, FILM COATED ORAL ONCE
OUTPATIENT
Start: 2024-12-19

## 2024-12-19 RX ORDER — METOPROLOL TARTRATE 25 MG/1
100 TABLET, FILM COATED ORAL ONCE
Status: CANCELLED | OUTPATIENT
Start: 2024-12-19

## 2024-12-19 RX ORDER — SODIUM CHLORIDE 9 MG/ML
40 INJECTION, SOLUTION INTRAVENOUS AS NEEDED
OUTPATIENT
Start: 2024-12-19

## 2024-12-19 RX ORDER — NITROGLYCERIN 0.4 MG/1
0.4 TABLET SUBLINGUAL
Status: CANCELLED | OUTPATIENT
Start: 2024-12-19 | End: 2024-12-19

## 2024-12-19 RX ORDER — METOPROLOL TARTRATE 50 MG
50 TABLET ORAL
OUTPATIENT
Start: 2024-12-19

## 2024-12-19 RX ORDER — NITROGLYCERIN 0.4 MG/1
0.4 TABLET SUBLINGUAL
OUTPATIENT
Start: 2024-12-19 | End: 2024-12-19

## 2024-12-19 RX ORDER — METOPROLOL TARTRATE 25 MG/1
100 TABLET, FILM COATED ORAL ONCE
OUTPATIENT
Start: 2024-12-19

## 2024-12-19 RX ORDER — SODIUM CHLORIDE 0.9 % (FLUSH) 0.9 %
10 SYRINGE (ML) INJECTION EVERY 12 HOURS SCHEDULED
OUTPATIENT
Start: 2024-12-19

## 2024-12-19 RX ORDER — METOPROLOL TARTRATE 50 MG
50 TABLET ORAL
Status: CANCELLED | OUTPATIENT
Start: 2024-12-19

## 2024-12-19 RX ORDER — NITROGLYCERIN 0.4 MG/1
0.8 TABLET SUBLINGUAL
Status: CANCELLED | OUTPATIENT
Start: 2024-12-19

## 2024-12-19 RX ORDER — NITROGLYCERIN 0.4 MG/1
0.8 TABLET SUBLINGUAL
OUTPATIENT
Start: 2024-12-19

## 2024-12-19 RX ORDER — METOPROLOL TARTRATE 1 MG/ML
5 INJECTION, SOLUTION INTRAVENOUS
Status: CANCELLED | OUTPATIENT
Start: 2024-12-19

## 2024-12-19 RX ORDER — CLOBETASOL PROPIONATE 0.5 MG/G
1 CREAM TOPICAL AS NEEDED
COMMUNITY
Start: 2024-12-19

## 2024-12-19 RX ORDER — METOPROLOL TARTRATE 25 MG/1
200 TABLET, FILM COATED ORAL ONCE
Status: CANCELLED | OUTPATIENT
Start: 2024-12-19 | End: 2024-12-19

## 2024-12-19 RX ORDER — METOPROLOL TARTRATE 25 MG/1
25 TABLET, FILM COATED ORAL ONCE
Status: CANCELLED | OUTPATIENT
Start: 2024-12-19

## 2024-12-19 RX ORDER — SODIUM CHLORIDE 9 MG/ML
40 INJECTION, SOLUTION INTRAVENOUS AS NEEDED
Status: CANCELLED | OUTPATIENT
Start: 2024-12-19

## 2024-12-19 RX ORDER — IVABRADINE 5 MG/1
15 TABLET, FILM COATED ORAL ONCE
OUTPATIENT
Start: 2024-12-19 | End: 2024-12-19

## 2024-12-19 RX ORDER — METOPROLOL TARTRATE 50 MG
TABLET ORAL
Qty: 2 TABLET | Refills: 0 | Status: SHIPPED | OUTPATIENT
Start: 2024-12-19

## 2024-12-19 RX ORDER — IVABRADINE 5 MG/1
15 TABLET, FILM COATED ORAL ONCE
Status: CANCELLED | OUTPATIENT
Start: 2024-12-19 | End: 2024-12-19

## 2024-12-19 RX ORDER — METOPROLOL TARTRATE 25 MG/1
25 TABLET, FILM COATED ORAL ONCE
OUTPATIENT
Start: 2024-12-19

## 2024-12-19 RX ORDER — METOPROLOL TARTRATE 50 MG
TABLET ORAL
Qty: 2 TABLET | Refills: 0 | Status: CANCELLED | OUTPATIENT
Start: 2024-12-19

## 2024-12-19 RX ORDER — METOPROLOL TARTRATE 25 MG/1
50 TABLET, FILM COATED ORAL ONCE
Status: CANCELLED | OUTPATIENT
Start: 2024-12-19

## 2024-12-19 NOTE — ASSESSMENT & PLAN NOTE
He reports that he will be winded when he push mows the yard and when he climbs the stairs and rambo after his dog.    Reports that the symptoms have been persistent and possibly slightly increased over the last 1 year.    Echo shows EF within normal limits.    CAD risk factors of hypertension and hyperlipidemia.    He has completed nuclear stress testing for further evaluation and restratification for CAD.    Stress test is reviewed in detail with patient at today's visit today showing:  -EKG sinus rhythm with nonspecific ST-T wave abnormality  -EF 55% with stress  -Moderate-sized area of inferior ischemia noted with wall motion abnormality  -Intermediate risk study    Plan:    Further evaluation with a coronary CTA    Follow-up after study to review the findings

## 2024-12-19 NOTE — TELEPHONE ENCOUNTER
----- Message from Irais Marion sent at 12/19/2024 11:19 AM EST -----  Regarding: hold med for procedure  He has medication Cialis that he cannot take with the nitro that he will get for the Coronary CTA. He needs to be off Cialis for atleast 48 hours before Nitro dose and for atleast 48 hours after the nitro dose. So lets hold Cialis 3 days before the study date and 3 days after the study date to be safe.

## 2024-12-19 NOTE — ASSESSMENT & PLAN NOTE
Stress test is reviewed in detail with patient at today's visit today showing:  -EKG sinus rhythm with nonspecific ST-T wave abnormality  -EF 55% with stress  -Moderate-sized area of inferior ischemia noted with wall motion abnormality  -Intermediate risk study    He has symptom of shortness of air that has had a slight increase over the last year with exertional activity    He has coexisting CAD risk factors of hypertension and hyperlipidemia    We discussed limitations of stress testing for restratification.    Plan:    Further evaluation with coronary CTA    Follow-up after study to review the findings

## 2024-12-19 NOTE — ASSESSMENT & PLAN NOTE
Pressure today 128/72.  This is well-controlled.    At his last visit he was asked to check his blood pressure at home.    Home blood pressure readings are as follows 125/83 heart rate 63, 132/83 heart rate 62, 134/75 heart rate 64.    He is currently on metoprolol succinate 50 mg daily and triamterene hydrochlorothiazide 37.5/25 mg daily.    Blood pressure is well-controlled.    Continue current blood pressure medication regimen.

## 2024-12-19 NOTE — PROGRESS NOTES
[facility] Provider Note     Date:   2024   Name: Stalin Diaz  :   1955  PCP: Tan Payne MD    Chief Complaint   Patient presents with    Shortness of Breath       Subjective     History of Present Illness  Stalin Diaz is a 69 y.o. male who presents today for follow-up on shortness of air.  He has had a slight increase in shortness of air with exertional activity over the last year.    He reports symptoms are the same since his last visit and have not increased or worsened.    He has completed a nuclear stress test and he wished to review the findings at today's visit.    Cardiac history:    Stress testing 2024 - Moderate size area of inferior ischemia note with wall motion abnormalities- Intermediate risk study     ECHO 2023:  ·  Left ventricular systolic function is normal. Left ventricular ejection fraction appears to be 56 - 60%.  ·  Left ventricular diastolic function is consistent with (grade I) impaired relaxation.  ·  There is mild calcification of the aortic valve mainly affecting the non-coronary and left coronary cusp(s).  ·  Estimated right ventricular systolic pressure from tricuspid regurgitation is normal (<35 mmHg).  ·  Mild dilation of the aortic root is present. Mild dilation of the sinuses of Valsalva is present, 3.9 cm.      Coexisting:hyperlipidemia and hypertension.    History of lung nodule Recent CT of Chest;  2023: stable pulmonary nodule for > 2 years: no further follow up needed         Reports Denies   Chest Pain [] [x]   Shortness of Air [x] []   Palpitations [] [x]   Edema [] [x]   Dizziness [] [x]   Syncope [] [x]       No Known Allergies    Current Outpatient Medications:     aspirin (ASPIR) 81 MG EC tablet, , Disp: , Rfl:     atorvastatin (LIPITOR) 80 MG tablet, Take 1 tablet by mouth Daily., Disp: , Rfl:     clobetasol propionate (TEMOVATE) 0.05 % cream, Apply 1 Application topically to the appropriate area  as directed As Needed., Disp: , Rfl:     finasteride (PROSCAR) 5 MG tablet, Take 1 tablet by mouth Daily., Disp: , Rfl:     meloxicam (MOBIC) 15 MG tablet, , Disp: , Rfl:     metoprolol succinate XL (TOPROL-XL) 50 MG 24 hr tablet, Take 1 tablet by mouth Daily., Disp: , Rfl:     pantoprazole (PROTONIX) 40 MG EC tablet, , Disp: , Rfl:     sertraline (ZOLOFT) 100 MG tablet, , Disp: , Rfl:     tadalafil (CIALIS) 5 MG tablet, Take 1 tablet by mouth Daily., Disp: , Rfl:     tamsulosin (FLOMAX) 0.4 MG capsule 24 hr capsule, Take 1 capsule by mouth Daily., Disp: , Rfl:     Trelegy Ellipta 100-62.5-25 MCG/ACT inhaler, , Disp: , Rfl:     triamterene-hydrochlorothiazide (DYAZIDE) 37.5-25 MG per capsule, Take 1 capsule by mouth Every Morning., Disp: , Rfl:     metoprolol tartrate (LOPRESSOR) 50 MG tablet, Take 50 mg at Bedtime the Night Before Coronary CTA Appointment and In the Morning 1 Hour Prior to Coronary CTA Appointment. Do not take if heart rate less than 60., Disp: 2 tablet, Rfl: 0    Past Medical History:   Diagnosis Date    Carotid artery stenosis     Chronic kidney disease     Coronary artery disease     Hyperlipidemia     Hypertension     Lung nodules     Retinal detachment     SVT (supraventricular tachycardia)     Thoracic aortic aneurysm       Past Surgical History:   Procedure Laterality Date    CATARACT EXTRACTION      KNEE SURGERY      RETINAL DETACHMENT SURGERY       Family History   Problem Relation Age of Onset    Heart attack Mother     Lung cancer Mother     Kidney failure Father     Crohn's disease Sister     Heart attack Brother      Social History     Socioeconomic History    Marital status:     Number of children: 1   Tobacco Use    Smoking status: Never     Passive exposure: Past    Smokeless tobacco: Never   Vaping Use    Vaping status: Never Used   Substance and Sexual Activity    Alcohol use: Not Currently    Drug use: Never    Sexual activity: Defer       Objective     Vital Signs:  BP  "128/72 (BP Location: Right arm, Patient Position: Sitting, Cuff Size: Adult)   Pulse 73   Ht 175.3 cm (69\")   Wt 104 kg (230 lb)   SpO2 96%   BMI 33.97 kg/m²   Estimated body mass index is 33.97 kg/m² as calculated from the following:    Height as of this encounter: 175.3 cm (69\").    Weight as of this encounter: 104 kg (230 lb).             Physical Exam  Constitutional:       Appearance: Normal appearance. He is well-developed.   HENT:      Head: Normocephalic and atraumatic.      Nose: Nose normal.      Mouth/Throat:      Mouth: Mucous membranes are moist.   Eyes:      General: No scleral icterus.     Pupils: Pupils are equal, round, and reactive to light.   Neck:      Vascular: No carotid bruit.   Cardiovascular:      Rate and Rhythm: Normal rate and regular rhythm.      Pulses: Normal pulses.           Radial pulses are 2+ on the right side and 2+ on the left side.        Dorsalis pedis pulses are 2+ on the right side and 2+ on the left side.        Posterior tibial pulses are 2+ on the right side and 2+ on the left side.      Heart sounds: Normal heart sounds. No murmur heard.  Pulmonary:      Effort: Pulmonary effort is normal.      Breath sounds: Normal breath sounds. No wheezing or rhonchi.   Abdominal:      General: Bowel sounds are normal.   Musculoskeletal:      Right lower leg: No edema.      Left lower leg: No edema.   Skin:     General: Skin is warm and dry.      Capillary Refill: Capillary refill takes less than 2 seconds.      Coloration: Skin is not cyanotic.      Nails: There is no clubbing.   Neurological:      Mental Status: He is alert and oriented to person, place, and time.      Motor: No weakness.      Gait: Gait normal.   Psychiatric:         Mood and Affect: Mood normal.         Behavior: Behavior normal. Behavior is cooperative.         Thought Content: Thought content normal.         Cognition and Memory: Memory normal.           Cardiology studies reviewed: Nuclear stress test from " [facility] well 17 2024          Assessment and Plan     Diagnoses and all orders for this visit:    1. Abnormal nuclear stress test (Primary)  Assessment & Plan:  Stress test is reviewed in detail with patient at today's visit today showing:  -EKG sinus rhythm with nonspecific ST-T wave abnormality  -EF 55% with stress  -Moderate-sized area of inferior ischemia noted with wall motion abnormality  -Intermediate risk study    He has symptom of shortness of air that has had a slight increase over the last year with exertional activity    He has coexisting CAD risk factors of hypertension and hyperlipidemia    We discussed limitations of stress testing for restratification.    Plan:    Further evaluation with coronary CTA    Follow-up after study to review the findings       Orders:  -     CT Angiogram Coronary; Future  -     CT Angiogram Coronary-Cardiology Interpretation; Future  -     metoprolol tartrate (LOPRESSOR) tablet 200 mg  -     metoprolol tartrate (LOPRESSOR) tablet 150 mg  -     metoprolol tartrate (LOPRESSOR) tablet 100 mg  -     metoprolol tartrate (LOPRESSOR) tablet 50 mg  -     metoprolol tartrate (LOPRESSOR) tablet 25 mg  -     metoprolol tartrate (LOPRESSOR) tablet 50 mg  -     metoprolol tartrate (LOPRESSOR) injection 5 mg  -     nitroglycerin (NITROSTAT) SL tablet 0.4 mg  -     nitroglycerin (NITROSTAT) SL tablet 0.8 mg  -     ivabradine HCl (CORLANOR) tablet 15 mg  -     sodium chloride 0.9 % flush 10 mL  -     sodium chloride 0.9 % flush 10 mL  -     sodium chloride 0.9 % infusion 40 mL    2. Shortness of breath  Assessment & Plan:  He reports that he will be winded when he push mows the yard and when he climbs the stairs and rambo after his dog.    Reports that the symptoms have been persistent and possibly slightly increased over the last 1 year.    Echo shows EF within normal limits.    CAD risk factors of hypertension and hyperlipidemia.    He has completed nuclear stress  testing for further evaluation and restratification for CAD.    Stress test is reviewed in detail with patient at today's visit today showing:  -EKG sinus rhythm with nonspecific ST-T wave abnormality  -EF 55% with stress  -Moderate-sized area of inferior ischemia noted with wall motion abnormality  -Intermediate risk study    Plan:    Further evaluation with a coronary CTA    Follow-up after study to review the findings      3. Essential hypertension  Assessment & Plan:  Pressure today 128/72.  This is well-controlled.    At his last visit he was asked to check his blood pressure at home.    Home blood pressure readings are as follows 125/83 heart rate 63, 132/83 heart rate 62, 134/75 heart rate 64.    He is currently on metoprolol succinate 50 mg daily and triamterene hydrochlorothiazide 37.5/25 mg daily.    Blood pressure is well-controlled.    Continue current blood pressure medication regimen.          Other orders  -     No Caffeine or Nicotine 4 Hours Prior to CTA Appointment  -     Nothing to Eat or Drink 4 Hours Prior to CTA Appointment  -     Do Not Take Phosphodiasterase Inhibitors in the 72 Hours Prior to Coronary CTA  -     Obtain Informed Consent - Computed Tomography Angiography of Chest - Angiogram of Coronary Arteries; Standing  -     Vital Signs Upon Arrival; Standing  -     Cardiac Monitoring; Standing  -     Verify NPO Status - Patient to Be NPO at Least 4 Hours Prior to CTA; Standing  -     Notify CT After Administration of metoprolol tartrate (LOPRESSOR) tablet; Standing  -     Notify Provider If Total Metoprolol Given Equals 300mg & Heart Rate Not At Goal; Standing  -     Notify Provider Prior to Administration of Nitroglycerin if Patient SBP <80; Standing  -     POC Creatinine; Standing  -     Insert Peripheral IV; Standing  -     Saline Lock & Maintain IV Access; Standing  -     Vital Signs - See Instructions; Standing  -     Hold Medication Metformin (Glucophage, Glucophage XR, Fortament,  Glumetza);  Metglip (metformin/glipizide);  Glucovance (metformin/glyburide); Avandamet (metformin/rosiglitazone); Standing  -     Patient May Discharge Home After Procedure Complete (If Stable); Standing  -     metoprolol tartrate (LOPRESSOR) 50 MG tablet; Take 50 mg at Bedtime the Night Before Coronary CTA Appointment and In the Morning 1 Hour Prior to Coronary CTA Appointment. Do not take if heart rate less than 60.  Dispense: 2 tablet; Refill: 0        Recommendations: Report if any new/changing symptoms immediately and Limitations of stress testing for definitive diagnosis reviewed          Follow Up  Return in about 6 weeks (around 1/30/2025) for CTA results and SOA followup .  Patient was given instructions and counseling regarding his condition or for health maintenance advice. Please see specific information pulled into the AVS if appropriate.

## 2025-02-04 ENCOUNTER — TELEPHONE (OUTPATIENT)
Dept: INFUSION THERAPY | Facility: HOSPITAL | Age: 70
End: 2025-02-04
Payer: MEDICARE

## 2025-02-04 NOTE — TELEPHONE ENCOUNTER
Pt contacted as pre-procedure phone call prior to planned CTA coronary for 2/5/25. Reviewed with patient arrival time between 9:30-9:45 am to main registration, nothing to eat or drink by mouth 2 hours prior to arrival, no caffeine after midnight, please take premedications night before and morning of procedure with a small sip of water as instructed,  recommended,  reviewed procedure instructions and allowed time for questions, and reviewed home medications, allergies, and medical history.

## 2025-02-05 ENCOUNTER — HOSPITAL ENCOUNTER (OUTPATIENT)
Dept: CT IMAGING | Facility: HOSPITAL | Age: 70
Discharge: HOME | End: 2025-02-05
Payer: MEDICARE

## 2025-02-05 VITALS
HEART RATE: 56 BPM | BODY MASS INDEX: 34.07 KG/M2 | WEIGHT: 230 LBS | HEIGHT: 69 IN | SYSTOLIC BLOOD PRESSURE: 126 MMHG | DIASTOLIC BLOOD PRESSURE: 60 MMHG | RESPIRATION RATE: 18 BRPM | OXYGEN SATURATION: 95 % | TEMPERATURE: 96.6 F

## 2025-02-05 DIAGNOSIS — R93.1 ABNORMAL FINDINGS DIAGNOSTIC IMAGING OF HEART AND CORONARY CIRCULATION: Primary | ICD-10-CM

## 2025-02-05 DIAGNOSIS — R94.39 ABNORMAL NUCLEAR STRESS TEST: ICD-10-CM

## 2025-02-05 DIAGNOSIS — R93.1 ABNORMAL FINDINGS DIAGNOSTIC IMAGING OF HEART AND CORONARY CIRCULATION: ICD-10-CM

## 2025-02-05 PROCEDURE — 75574 CT ANGIO HRT W/3D IMAGE: CPT

## 2025-02-05 PROCEDURE — A9270 NON-COVERED ITEM OR SERVICE: HCPCS | Performed by: NURSE PRACTITIONER

## 2025-02-05 PROCEDURE — 25510000001 IOPAMIDOL PER 1 ML: Performed by: NURSE PRACTITIONER

## 2025-02-05 PROCEDURE — 75580 N-INVAS EST C FFR SW ALY CTA: CPT

## 2025-02-05 PROCEDURE — 63710000001 NITROGLYCERIN 0.4 MG SUBLINGUAL TABLET: Performed by: NURSE PRACTITIONER

## 2025-02-05 RX ORDER — METOPROLOL TARTRATE 100 MG/1
100 TABLET ORAL ONCE
Status: DISCONTINUED | OUTPATIENT
Start: 2025-02-05 | End: 2025-02-06 | Stop reason: HOSPADM

## 2025-02-05 RX ORDER — IOPAMIDOL 755 MG/ML
100 INJECTION, SOLUTION INTRAVASCULAR
Status: COMPLETED | OUTPATIENT
Start: 2025-02-05 | End: 2025-02-05

## 2025-02-05 RX ORDER — NITROGLYCERIN 0.4 MG/1
0.8 TABLET SUBLINGUAL
Status: COMPLETED | OUTPATIENT
Start: 2025-02-05 | End: 2025-02-05

## 2025-02-05 RX ORDER — SODIUM CHLORIDE 9 MG/ML
40 INJECTION, SOLUTION INTRAVENOUS AS NEEDED
Status: DISCONTINUED | OUTPATIENT
Start: 2025-02-05 | End: 2025-02-06 | Stop reason: HOSPADM

## 2025-02-05 RX ORDER — METOPROLOL TARTRATE 25 MG/1
25 TABLET, FILM COATED ORAL ONCE
Status: DISCONTINUED | OUTPATIENT
Start: 2025-02-05 | End: 2025-02-06 | Stop reason: HOSPADM

## 2025-02-05 RX ORDER — METOPROLOL TARTRATE 100 MG/1
200 TABLET ORAL ONCE
Status: DISCONTINUED | OUTPATIENT
Start: 2025-02-05 | End: 2025-02-06 | Stop reason: HOSPADM

## 2025-02-05 RX ORDER — IVABRADINE 5 MG/1
15 TABLET, FILM COATED ORAL ONCE
Status: DISCONTINUED | OUTPATIENT
Start: 2025-02-05 | End: 2025-02-06 | Stop reason: HOSPADM

## 2025-02-05 RX ORDER — NITROGLYCERIN 0.4 MG/1
0.4 TABLET SUBLINGUAL
Status: COMPLETED | OUTPATIENT
Start: 2025-02-05 | End: 2025-02-05

## 2025-02-05 RX ORDER — METOPROLOL TARTRATE 50 MG
50 TABLET ORAL
Status: DISCONTINUED | OUTPATIENT
Start: 2025-02-05 | End: 2025-02-06 | Stop reason: HOSPADM

## 2025-02-05 RX ORDER — SODIUM CHLORIDE 0.9 % (FLUSH) 0.9 %
10 SYRINGE (ML) INJECTION EVERY 12 HOURS SCHEDULED
Status: DISCONTINUED | OUTPATIENT
Start: 2025-02-05 | End: 2025-02-06 | Stop reason: HOSPADM

## 2025-02-05 RX ORDER — METOPROLOL TARTRATE 1 MG/ML
5 INJECTION, SOLUTION INTRAVENOUS
Status: DISCONTINUED | OUTPATIENT
Start: 2025-02-05 | End: 2025-02-06 | Stop reason: HOSPADM

## 2025-02-05 RX ORDER — SODIUM CHLORIDE 0.9 % (FLUSH) 0.9 %
10 SYRINGE (ML) INJECTION AS NEEDED
Status: DISCONTINUED | OUTPATIENT
Start: 2025-02-05 | End: 2025-02-06 | Stop reason: HOSPADM

## 2025-02-05 RX ORDER — METOPROLOL TARTRATE 50 MG
50 TABLET ORAL ONCE
Status: DISCONTINUED | OUTPATIENT
Start: 2025-02-05 | End: 2025-02-06 | Stop reason: HOSPADM

## 2025-02-05 RX ADMIN — NITROGLYCERIN 0.8 MG: 0.4 TABLET SUBLINGUAL at 09:43

## 2025-02-05 RX ADMIN — IOPAMIDOL 70 ML: 755 INJECTION, SOLUTION INTRAVENOUS at 09:50

## 2025-02-06 ENCOUNTER — TELEPHONE (OUTPATIENT)
Dept: CARDIOLOGY | Facility: CLINIC | Age: 70
End: 2025-02-06
Payer: MEDICARE

## 2025-02-06 NOTE — TELEPHONE ENCOUNTER
----- Message from Irais Marion sent at 2/6/2025  2:33 PM EST -----  Call the patient and let him know that I have seen the results of his coronary CTA and he does have coronary artery disease.  It does not look like he needs a stent at this time, I would like to to see him in the office and discussed the specific results and his treatment plan.  Please schedule him for my next available appointment.

## 2025-02-13 ENCOUNTER — OFFICE VISIT (OUTPATIENT)
Dept: CARDIOLOGY | Facility: CLINIC | Age: 70
End: 2025-02-13
Payer: MEDICARE

## 2025-02-13 VITALS
OXYGEN SATURATION: 94 % | DIASTOLIC BLOOD PRESSURE: 64 MMHG | HEART RATE: 68 BPM | WEIGHT: 226 LBS | HEIGHT: 69 IN | SYSTOLIC BLOOD PRESSURE: 122 MMHG | BODY MASS INDEX: 33.47 KG/M2

## 2025-02-13 DIAGNOSIS — I25.110 CORONARY ARTERY DISEASE INVOLVING NATIVE CORONARY ARTERY OF NATIVE HEART WITH UNSTABLE ANGINA PECTORIS: Primary | ICD-10-CM

## 2025-02-13 DIAGNOSIS — E78.5 HYPERLIPIDEMIA LDL GOAL <100: ICD-10-CM

## 2025-02-13 DIAGNOSIS — I10 ESSENTIAL HYPERTENSION: ICD-10-CM

## 2025-02-13 PROCEDURE — 1159F MED LIST DOCD IN RCRD: CPT | Performed by: NURSE PRACTITIONER

## 2025-02-13 PROCEDURE — 1160F RVW MEDS BY RX/DR IN RCRD: CPT | Performed by: NURSE PRACTITIONER

## 2025-02-13 PROCEDURE — 99214 OFFICE O/P EST MOD 30 MIN: CPT | Performed by: NURSE PRACTITIONER

## 2025-02-13 PROCEDURE — 3074F SYST BP LT 130 MM HG: CPT | Performed by: NURSE PRACTITIONER

## 2025-02-13 PROCEDURE — 3078F DIAST BP <80 MM HG: CPT | Performed by: NURSE PRACTITIONER

## 2025-02-13 RX ORDER — NITROGLYCERIN 0.4 MG/1
TABLET SUBLINGUAL
Qty: 25 TABLET | Refills: 1 | Status: SHIPPED | OUTPATIENT
Start: 2025-02-13

## 2025-02-13 NOTE — PROGRESS NOTES
[facility] Provider Note     Date:   2025   Name: Stalin Diaz  :   1955  PCP: Tan Payne MD    Chief Complaint   Patient presents with    Shortness of Breath       Subjective     History of Present Illness  Stalin Diaz is a 69 y.o. male who presents today for follow-up on shortness of air that has been slightly increased with exertional activity over the last year.  He had a moderately abnormal stress test.  He has completed a coronary CTA for further evaluation and he is here today to review the findings.        Cardiac history:    Stress testing 2024 - Moderate size area of inferior ischemia note with wall motion abnormalities- Intermediate risk study     Coronary CTA 2025:  IMPRESSION:  1. 31 mm long calcified plaque in proximal and mid LAD.  50-70% stenosis visually (CAD-RADS 3).   Mild calcified plaque in D1.  Mild scattered mixed plaque scattered in RCA .  Coronary artery calcium score 601.8.  Right dominant circulation  2. LVEF 64%  3. Please refer to the radiology report for information on extra-cardiac structures.       RECOMMENDATION:  1. Medical and lifestyle modifications for extensive but non obstructive coronary artery disease.  Given moderate stenosis in LAD (50-70% stenosis) would suggest CT FFR (I will order) to better assess for obstruction.      CT FFR 2025 showing:  LAD: Proximal 0.81, mid LAD 0.86  Right coronary artery 0.91  Left circumflex 0.92    ECHO 2023:  ·  Left ventricular systolic function is normal. Left ventricular ejection fraction appears to be 56 - 60%.  ·  Left ventricular diastolic function is consistent with (grade I) impaired relaxation.  ·  There is mild calcification of the aortic valve mainly affecting the non-coronary and left coronary cusp(s).  ·  Estimated right ventricular systolic pressure from tricuspid regurgitation is normal (<35 mmHg).  ·  Mild dilation of the aortic root is present. Mild  dilation of the sinuses of Valsalva is present, 3.9 cm.      Coexisting:hyperlipidemia and hypertension.    History of lung nodule Recent CT of Chest;  11/06/2023: stable pulmonary nodule for > 2 years: no further follow up needed         Reports Denies   Chest Pain [] [x]   Shortness of Air [x] []   Palpitations [] [x]   Edema [] [x]   Dizziness [] [x]   Syncope [] [x]       No Known Allergies    Current Outpatient Medications:     aspirin (ASPIR) 81 MG EC tablet, , Disp: , Rfl:     atorvastatin (LIPITOR) 80 MG tablet, Take 1 tablet by mouth Daily., Disp: , Rfl:     clobetasol propionate (TEMOVATE) 0.05 % cream, Apply 1 Application topically to the appropriate area as directed As Needed., Disp: , Rfl:     finasteride (PROSCAR) 5 MG tablet, Take 1 tablet by mouth Daily., Disp: , Rfl:     meloxicam (MOBIC) 15 MG tablet, As Needed., Disp: , Rfl:     metoprolol succinate XL (TOPROL-XL) 50 MG 24 hr tablet, Take 1 tablet by mouth Daily., Disp: , Rfl:     pantoprazole (PROTONIX) 40 MG EC tablet, Take 1 tablet by mouth Every Night., Disp: , Rfl:     sertraline (ZOLOFT) 100 MG tablet, Take 1 tablet by mouth Every Night., Disp: , Rfl:     tamsulosin (FLOMAX) 0.4 MG capsule 24 hr capsule, Take 1 capsule by mouth 2 (Two) Times a Day., Disp: , Rfl:     Trelegy Ellipta 100-62.5-25 MCG/ACT inhaler, As Needed., Disp: , Rfl:     triamterene-hydrochlorothiazide (DYAZIDE) 37.5-25 MG per capsule, Take 1 capsule by mouth Every Morning., Disp: , Rfl:     Evolocumab (REPATHA) solution prefilled syringe injection, Inject 1 mL under the skin into the appropriate area as directed Every 14 (Fourteen) Days., Disp: 1 mL, Rfl: 25    nitroglycerin (NITROSTAT) 0.4 MG SL tablet, 1 under the tongue as needed for angina, may repeat q5mins for up three doses, Disp: 25 tablet, Rfl: 1    Past Medical History:   Diagnosis Date    Carotid artery stenosis     Chronic kidney disease     Coronary artery disease     Hyperlipidemia     Hypertension     Lung  "nodules     Retinal detachment     SVT (supraventricular tachycardia)     Thoracic aortic aneurysm       Past Surgical History:   Procedure Laterality Date    CATARACT EXTRACTION      KNEE SURGERY      RETINAL DETACHMENT SURGERY       Family History   Problem Relation Age of Onset    Heart attack Mother     Lung cancer Mother     Kidney failure Father     Crohn's disease Sister     Heart attack Brother      Social History     Socioeconomic History    Marital status:     Number of children: 1   Tobacco Use    Smoking status: Never     Passive exposure: Past    Smokeless tobacco: Never   Vaping Use    Vaping status: Never Used   Substance and Sexual Activity    Alcohol use: Not Currently    Drug use: Never    Sexual activity: Defer       Objective     Vital Signs:  /64 (BP Location: Right arm, Patient Position: Sitting, Cuff Size: Large Adult)   Pulse 68   Ht 175.3 cm (69\")   Wt 103 kg (226 lb)   SpO2 94%   BMI 33.37 kg/m²   Estimated body mass index is 33.37 kg/m² as calculated from the following:    Height as of this encounter: 175.3 cm (69\").    Weight as of this encounter: 103 kg (226 lb).             Physical Exam  Constitutional:       Appearance: Normal appearance. He is well-developed.   HENT:      Head: Normocephalic and atraumatic.      Nose: Nose normal.      Mouth/Throat:      Mouth: Mucous membranes are moist.   Eyes:      General: No scleral icterus.     Pupils: Pupils are equal, round, and reactive to light.   Neck:      Vascular: No carotid bruit.   Cardiovascular:      Rate and Rhythm: Normal rate and regular rhythm.      Pulses: Normal pulses.           Radial pulses are 2+ on the right side and 2+ on the left side.        Dorsalis pedis pulses are 2+ on the right side and 2+ on the left side.        Posterior tibial pulses are 2+ on the right side and 2+ on the left side.      Heart sounds: Normal heart sounds. No murmur heard.  Pulmonary:      Effort: Pulmonary effort is normal. "      Breath sounds: Normal breath sounds. No wheezing or rhonchi.   Abdominal:      General: Bowel sounds are normal.   Musculoskeletal:      Right lower leg: No edema.      Left lower leg: No edema.   Skin:     General: Skin is warm and dry.      Capillary Refill: Capillary refill takes less than 2 seconds.      Coloration: Skin is not cyanotic.      Nails: There is no clubbing.   Neurological:      Mental Status: He is alert and oriented to person, place, and time.      Motor: No weakness.      Gait: Gait normal.   Psychiatric:         Mood and Affect: Mood normal.         Behavior: Behavior normal. Behavior is cooperative.         Thought Content: Thought content normal.         Cognition and Memory: Memory normal.           Cardiology studies reviewed: Coronary CTA and FFR [facility] 2/5/2025          Assessment and Plan     Diagnoses and all orders for this visit:    1. Coronary artery disease involving native coronary artery of native heart with unstable angina pectoris (Primary)  Assessment & Plan:  New diagnosis of coronary artery disease.  He has an anginal equivalent of shortness of air.    Noted 31 mm long calcified plaque in the proximal and mid LAD with a 50 to 70% stenosis seen on coronary CTA    CT FFR completed showing proximal LAD 0.8 and mid LAD 0.86    Patient's symptom is a gradually increasing shortness of air with exertional activity over the last 1 year.  Has not worsened since his last visit.  Patient denies any chest pain or pressure or heaviness.    He is on medical management of   - aspirin 81 mg daily  -Max statin on atorvastatin 80 mg daily  -Metoprolol succinate 25 mg daily    We discussed today off Cialis.  Patient reports he had not resume this medication since his coronary CTA    I placed an order for as needed nitroglycerin.  Discussed in detail nitroglycerin sublingual 1 every 5 minutes under the tongue for chest pain or pressure and then I advised to ER  immediately.  Patient verbalized understanding.    Discussed adding Repatha prescription sent to pharmacy.  Discussed may be a pre-CERT process.  May be an expensive medication.  If it is expensive then please return to office for patient assistance program information.  Patient verbalized understanding.    Discussed consideration to move forward with a left heart cath.  Patient is going to consider this and I will give him a call back on Monday to discuss further.      Orders:  -     Evolocumab (REPATHA) solution prefilled syringe injection; Inject 1 mL under the skin into the appropriate area as directed Every 14 (Fourteen) Days.  Dispense: 1 mL; Refill: 25  -     nitroglycerin (NITROSTAT) 0.4 MG SL tablet; 1 under the tongue as needed for angina, may repeat q5mins for up three doses  Dispense: 25 tablet; Refill: 1    2. Hyperlipidemia LDL goal <100  Assessment & Plan:   Last fasting lipids 8/26/2024 showing cholesterol levels  -Total cholesterol 153  -HDL 41  -LDL 78  -Triglycerides 170    He is on atorvastatin 80 mg and we plan to continue.    Although his LDL is controlled he has a new diagnosis of significant LAD CAD.    We discussed today adding Repatha.  Prescription sent to pharmacy.    Continue heart healthy diet with lower carbohydrates and lower sugar intake.  He reports that he has improved his diet and his weight is down about 4 pounds over the last month.    We will need to recheck fasting lipids and CMP after being on Repatha 3 months.        Orders:  -     Evolocumab (REPATHA) solution prefilled syringe injection; Inject 1 mL under the skin into the appropriate area as directed Every 14 (Fourteen) Days.  Dispense: 1 mL; Refill: 25    3. Essential hypertension  Assessment & Plan:  Pressure today 122/64.    Patient reports he checks at home and lowest blood pressure he seen with systolic 117.    He is currently on:  -Metoprolol succinate 50 mg daily  -Triamterene hydrochlorothiazide 37.5/25 mg  daily    Did discuss that we may want to stop this triamterene hydrochlorothiazide and give consideration to adding in isosorbide mono and then the hydrochlorothiazide separately.  This was only discussion and consideration today    His blood pressure is well-controlled we will leave his current blood pressure medication regimen depending on if he moves forward with a heart cath.  If he ends up needing stents then his blood pressure may improve after the stent.  We will just wait on this          Recommendations: ER if symptoms increase and Report if any new/changing symptoms immediately          Follow Up  Return in about 1 month (around 3/13/2025) for CAD/SOA.  Patient was given instructions and counseling regarding his condition or for health maintenance advice. Please see specific information pulled into the AVS if appropriate.

## 2025-02-14 NOTE — ASSESSMENT & PLAN NOTE
Pressure today 122/64.    Patient reports he checks at home and lowest blood pressure he seen with systolic 117.    He is currently on:  -Metoprolol succinate 50 mg daily  -Triamterene hydrochlorothiazide 37.5/25 mg daily    Did discuss that we may want to stop this triamterene hydrochlorothiazide and give consideration to adding in isosorbide mono and then the hydrochlorothiazide separately.  This was only discussion and consideration today    His blood pressure is well-controlled we will leave his current blood pressure medication regimen depending on if he moves forward with a heart cath.  If he ends up needing stents then his blood pressure may improve after the stent.  We will just wait on this

## 2025-02-14 NOTE — ASSESSMENT & PLAN NOTE
Last fasting lipids 8/26/2024 showing cholesterol levels  -Total cholesterol 153  -HDL 41  -LDL 78  -Triglycerides 170    He is on atorvastatin 80 mg and we plan to continue.    Although his LDL is controlled he has a new diagnosis of significant LAD CAD.    We discussed today adding Repatha.  Prescription sent to pharmacy.    Continue heart healthy diet with lower carbohydrates and lower sugar intake.  He reports that he has improved his diet and his weight is down about 4 pounds over the last month.    We will need to recheck fasting lipids and CMP after being on Repatha 3 months.

## 2025-02-18 ENCOUNTER — TELEPHONE (OUTPATIENT)
Dept: CARDIOLOGY | Facility: CLINIC | Age: 70
End: 2025-02-18
Payer: MEDICARE

## 2025-02-18 NOTE — TELEPHONE ENCOUNTER
I spoke to Mr. Diaz via telephone and let him know that Dr. Buck said that the Repatha will help stabilize his plaque but Repatha will not shrink the plaque.  She feels like that coronary CTA scan slightly overestimate or underestimate with findings.  There is limitations to this study to be able to definitively diagnose nonflow-limiting coronary artery disease.  Dr. Buck's advice is to proceed with left heart cath with his symptom of shortness of air with exertional activity and on medical therapy.  Patient reports that he declines the heart cath today but he will consider this option and he will follow-up shortly in 1 month.  We again reviewed to ER for symptoms such as worsening shortness of air or chest pains and he is in agreement.  He also verbalized understanding of his nitroglycerin use for any chest pain.

## 2025-02-18 NOTE — TELEPHONE ENCOUNTER
----- Message from Irais Marion sent at 2/18/2025  3:17 PM EST -----  Regarding: heart cath  Please call the patient and let him know that I did talk to Dr. Buck.  Dr. Buck also thought it was best that we move forward with a heart cath.  She agrees that the best way to measure the blockage is on the heart cath.  So I am just calling back to see if I can go ahead and order a heart cath for further evaluation.

## 2025-02-18 NOTE — TELEPHONE ENCOUNTER
SPOKE WITH PATIENT. INFORMED PATIENT OF MESSAGE. PATIENT STATED THAT HE WOULD LIKE TO DECLINE HEART CATH AT THIS TIME. FORWARDED CALL TO ANGELA DEL ROSARIO TO SPEAK WITH PATIENT.

## 2025-02-27 LAB — CREAT BLDA-MCNC: 1.4 MG/DL (ref 0.6–1.3)

## 2025-03-13 ENCOUNTER — OFFICE VISIT (OUTPATIENT)
Dept: CARDIOLOGY | Facility: CLINIC | Age: 70
End: 2025-03-13
Payer: MEDICARE

## 2025-03-13 VITALS
HEART RATE: 84 BPM | SYSTOLIC BLOOD PRESSURE: 118 MMHG | BODY MASS INDEX: 33.47 KG/M2 | DIASTOLIC BLOOD PRESSURE: 64 MMHG | OXYGEN SATURATION: 96 % | WEIGHT: 226 LBS | HEIGHT: 69 IN

## 2025-03-13 DIAGNOSIS — I25.110 CORONARY ARTERY DISEASE INVOLVING NATIVE CORONARY ARTERY OF NATIVE HEART WITH UNSTABLE ANGINA PECTORIS: Primary | ICD-10-CM

## 2025-03-13 DIAGNOSIS — I10 ESSENTIAL HYPERTENSION: ICD-10-CM

## 2025-03-13 DIAGNOSIS — E78.5 HYPERLIPIDEMIA LDL GOAL <100: ICD-10-CM

## 2025-03-13 PROCEDURE — 3078F DIAST BP <80 MM HG: CPT | Performed by: NURSE PRACTITIONER

## 2025-03-13 PROCEDURE — 99214 OFFICE O/P EST MOD 30 MIN: CPT | Performed by: NURSE PRACTITIONER

## 2025-03-13 PROCEDURE — 3074F SYST BP LT 130 MM HG: CPT | Performed by: NURSE PRACTITIONER

## 2025-03-13 RX ORDER — EVOLOCUMAB 140 MG/ML
140 INJECTION, SOLUTION SUBCUTANEOUS
COMMUNITY
Start: 2025-03-12

## 2025-03-13 NOTE — ASSESSMENT & PLAN NOTE
He has a's coronary CTA showing CAD.    He has been on a statin for high cholesterol.    We started Repatha and he reports he has had 2 doses and has tolerated well.    He reports he is continuing a heart healthy diet lowering his carbohydrates lowering his sugars.  Weight is stable.    Plan:    At this time continue his statin and continue Repatha    Check fasting lipids and CMP    Follow-up in a month

## 2025-03-13 NOTE — PROGRESS NOTES
[facility] Provider Note     Date:   2025   Name: Stalin Diaz  :   1955  PCP: Tan Payne MD    Chief Complaint   Patient presents with    Coronary Artery Disease     Pt here for one month follow up CAD/SOA.  Has had two injections of Repatha.       Subjective     History of Present Illness  Stalin Diaz is a 69 y.o. male who presents today for short follow-up on CAD with shortness of air.    He reports that he is only having shortness of air with exertional activity and chasing the dog.  He denies any chest pain or any chest pressure.    He reports that his blood pressure at home has been running low.  He reports when he bends over and stands back up he is getting dizzy.  Blood pressures at home log show lowest blood pressure 101/52 and highest blood pressure 116/70.  We discussed and we will stop triamterene hydrochlorothiazide 37.5/25 mg daily.  He reports he has been on this medicine for years for some mild bilateral lower extremity edema and blood pressure control.    He reports that he does have some concern over heart cath with his kidney function.  Noted that his creatinine had been 1.4 on his last labs.  Prior to that he had been 1.3.  This has been a concern for him moving forward with heart cath.  We discussed today that we can recheck his labs after being off of the diuretic for a few weeks and see if his kidney function has improved.    He reports that he is considering moving forward with heart cath but would like to see how his kidney function responds to coming off of his water pill.    Cardiac history:    Stress testing 2024 - Moderate size area of inferior ischemia note with wall motion abnormalities- Intermediate risk study     Coronary CTA 2025:  IMPRESSION:  1. 31 mm long calcified plaque in proximal and mid LAD.  50-70% stenosis visually (CAD-RADS 3).   Mild calcified plaque in D1.  Mild scattered mixed plaque scattered in RCA .   Coronary artery calcium score 601.8.  Right dominant circulation  2. LVEF 64%  3. Please refer to the radiology report for information on extra-cardiac structures.       RECOMMENDATION:  1. Medical and lifestyle modifications for extensive but non obstructive coronary artery disease.  Given moderate stenosis in LAD (50-70% stenosis) would suggest CT FFR (I will order) to better assess for obstruction.      CT FFR 2/5/2025 showing:  LAD: Proximal 0.81, mid LAD 0.86  Right coronary artery 0.91  Left circumflex 0.92    ECHO 12/14/2023:  ·  Left ventricular systolic function is normal. Left ventricular ejection fraction appears to be 56 - 60%.  ·  Left ventricular diastolic function is consistent with (grade I) impaired relaxation.  ·  There is mild calcification of the aortic valve mainly affecting the non-coronary and left coronary cusp(s).  ·  Estimated right ventricular systolic pressure from tricuspid regurgitation is normal (<35 mmHg).  ·  Mild dilation of the aortic root is present. Mild dilation of the sinuses of Valsalva is present, 3.9 cm.      Coexisting:hyperlipidemia and hypertension.    History of lung nodule Recent CT of Chest;  11/06/2023: stable pulmonary nodule for > 2 years: no further follow up needed         Reports Denies   Chest Pain [] [x]   Shortness of Air [x] []   Palpitations [] [x]   Edema [] [x]   Dizziness [x] []   Syncope [] [x]       No Known Allergies    Current Outpatient Medications:     aspirin (ASPIR) 81 MG EC tablet, , Disp: , Rfl:     atorvastatin (LIPITOR) 80 MG tablet, Take 1 tablet by mouth Daily., Disp: , Rfl:     clobetasol propionate (TEMOVATE) 0.05 % cream, Apply 1 Application topically to the appropriate area as directed As Needed., Disp: , Rfl:     finasteride (PROSCAR) 5 MG tablet, Take 1 tablet by mouth Daily., Disp: , Rfl:     metoprolol succinate XL (TOPROL-XL) 50 MG 24 hr tablet, Take 1 tablet by mouth Daily., Disp: , Rfl:     nitroglycerin (NITROSTAT) 0.4 MG SL  "tablet, 1 under the tongue as needed for angina, may repeat q5mins for up three doses, Disp: 25 tablet, Rfl: 1    pantoprazole (PROTONIX) 40 MG EC tablet, Take 1 tablet by mouth Every Night., Disp: , Rfl:     Repatha SureClick solution auto-injector SureClick injection, Inject 1 mL under the skin into the appropriate area as directed Every 14 (Fourteen) Days., Disp: , Rfl:     sertraline (ZOLOFT) 100 MG tablet, Take 1 tablet by mouth Every Night., Disp: , Rfl:     tamsulosin (FLOMAX) 0.4 MG capsule 24 hr capsule, Take 1 capsule by mouth 2 (Two) Times a Day., Disp: , Rfl:     Trelegy Ellipta 100-62.5-25 MCG/ACT inhaler, As Needed., Disp: , Rfl:     Past Medical History:   Diagnosis Date    Carotid artery stenosis     Chronic kidney disease     Coronary artery disease     Hyperlipidemia     Hypertension     Lung nodules     Retinal detachment     SVT (supraventricular tachycardia)     Thoracic aortic aneurysm       Past Surgical History:   Procedure Laterality Date    CATARACT EXTRACTION      KNEE SURGERY      RETINAL DETACHMENT SURGERY       Family History   Problem Relation Age of Onset    Heart attack Mother     Lung cancer Mother     Kidney failure Father     Crohn's disease Sister     Heart attack Brother      Social History     Socioeconomic History    Marital status:     Number of children: 1   Tobacco Use    Smoking status: Never     Passive exposure: Past    Smokeless tobacco: Never   Vaping Use    Vaping status: Never Used   Substance and Sexual Activity    Alcohol use: Not Currently    Drug use: Never    Sexual activity: Defer       Objective     Vital Signs:  /64 (BP Location: Right arm, Patient Position: Sitting, Cuff Size: Adult)   Pulse 84   Ht 175.3 cm (69\")   Wt 103 kg (226 lb)   SpO2 96%   BMI 33.37 kg/m²   Estimated body mass index is 33.37 kg/m² as calculated from the following:    Height as of this encounter: 175.3 cm (69\").    Weight as of this encounter: 103 kg (226 lb).   "           Physical Exam  Constitutional:       Appearance: Normal appearance. He is well-developed.   HENT:      Head: Normocephalic and atraumatic.      Nose: Nose normal.      Mouth/Throat:      Mouth: Mucous membranes are moist.   Eyes:      General: No scleral icterus.     Pupils: Pupils are equal, round, and reactive to light.   Neck:      Vascular: No carotid bruit.   Cardiovascular:      Rate and Rhythm: Normal rate and regular rhythm.      Pulses: Normal pulses.           Radial pulses are 2+ on the right side and 2+ on the left side.        Dorsalis pedis pulses are 2+ on the right side and 2+ on the left side.        Posterior tibial pulses are 2+ on the right side and 2+ on the left side.      Heart sounds: Normal heart sounds. No murmur heard.  Pulmonary:      Effort: Pulmonary effort is normal.      Breath sounds: Normal breath sounds. No wheezing or rhonchi.   Abdominal:      General: Bowel sounds are normal.   Musculoskeletal:      Right lower leg: No edema.      Left lower leg: No edema.   Skin:     General: Skin is warm and dry.      Capillary Refill: Capillary refill takes less than 2 seconds.      Coloration: Skin is not cyanotic.      Nails: There is no clubbing.   Neurological:      Mental Status: He is alert and oriented to person, place, and time.      Motor: No weakness.      Gait: Gait normal.   Psychiatric:         Mood and Affect: Mood normal.         Behavior: Behavior normal. Behavior is cooperative.         Thought Content: Thought content normal.         Cognition and Memory: Memory normal.                     Assessment and Plan     Diagnoses and all orders for this visit:    1. Coronary artery disease involving native coronary artery of native heart with unstable angina pectoris (Primary)  Assessment & Plan:  Recent diagnosis of coronary artery disease in February 2025 seen on coronary CTA with FFR.  He has LAD disease.    He reports that his shortness of air with exertional activity  that has been present for about a year has not worsened since his last visit.    He denies any chest pressure pain or heaviness.    He is on medical management including:  -Aspirin  -Metoprolol succinate  -Max statin  -Repatha which he just started last month.  He reports no side effects or problems with this medication.  We discussed we would check some labs since he has started a new medication.    He has a as needed nitroglycerin.  He has not had to use it.    He again verbalizes that he has discontinued Cialis.  He understands not to restart.    We again discussed moving forward with a left heart cath.  Patient again reports since his symptoms are stable he is unsure that he is willing to move forward.  He has concerns about kidney function and receiving the IV dye.    We discussed stopping triamterene hydrochlorothiazide diuretic which may be contributing to his creatinine that is mildly elevated    We will recheck labs and have a short follow-up in 1 month.    Orders:  -     CBC & Differential; Future  -     Comprehensive Metabolic Panel; Future  -     Lipid Panel; Future    2. Essential hypertension  Assessment & Plan:  Blood pressure today 118/64.  This is well-controlled but he has been keeping a home blood pressure log and his highest blood pressure at home is 116/70 and his lowest blood pressure 101/52.    He also reports that he has had some dizziness when changing positions such as bending over and standing up.    We discussed today that we will stop the triamterene hydrochlorothiazide    Continue metoprolol succinate 50 mg daily    Patient is to check his blood pressure and heart rate at home daily and he is to call for any systolic blood pressures greater than 150.    We will give consideration to restarting the hydrochlorothiazide at a lower dose if he has bilateral lower extremity edema as he did in the past.    Short follow-up in 1 month      3. Hyperlipidemia LDL goal <100  Assessment & Plan:  He  has a's coronary CTA showing CAD.    He has been on a statin for high cholesterol.    We started Repatha and he reports he has had 2 doses and has tolerated well.    He reports he is continuing a heart healthy diet lowering his carbohydrates lowering his sugars.  Weight is stable.    Plan:    At this time continue his statin and continue Repatha    Check fasting lipids and CMP    Follow-up in a month          Recommendations: Report if any new/changing symptoms immediately          Follow Up  Return in about 1 month (around 4/13/2025) for cad/htn/labs.  Patient was given instructions and counseling regarding his condition or for health maintenance advice. Please see specific information pulled into the AVS if appropriate.

## 2025-03-13 NOTE — ASSESSMENT & PLAN NOTE
Blood pressure today 118/64.  This is well-controlled but he has been keeping a home blood pressure log and his highest blood pressure at home is 116/70 and his lowest blood pressure 101/52.    He also reports that he has had some dizziness when changing positions such as bending over and standing up.    We discussed today that we will stop the triamterene hydrochlorothiazide    Continue metoprolol succinate 50 mg daily    Patient is to check his blood pressure and heart rate at home daily and he is to call for any systolic blood pressures greater than 150.    We will give consideration to restarting the hydrochlorothiazide at a lower dose if he has bilateral lower extremity edema as he did in the past.    Short follow-up in 1 month

## 2025-03-13 NOTE — ASSESSMENT & PLAN NOTE
Recent diagnosis of coronary artery disease in February 2025 seen on coronary CTA with FFR.  He has LAD disease.    He reports that his shortness of air with exertional activity that has been present for about a year has not worsened since his last visit.    He denies any chest pressure pain or heaviness.    He is on medical management including:  -Aspirin  -Metoprolol succinate  -Max statin  -Repatha which he just started last month.  He reports no side effects or problems with this medication.  We discussed we would check some labs since he has started a new medication.    He has a as needed nitroglycerin.  He has not had to use it.    He again verbalizes that he has discontinued Cialis.  He understands not to restart.    We again discussed moving forward with a left heart cath.  Patient again reports since his symptoms are stable he is unsure that he is willing to move forward.  He has concerns about kidney function and receiving the IV dye.    We discussed stopping triamterene hydrochlorothiazide diuretic which may be contributing to his creatinine that is mildly elevated    We will recheck labs and have a short follow-up in 1 month.

## 2025-04-10 ENCOUNTER — OFFICE VISIT (OUTPATIENT)
Dept: CARDIOLOGY | Facility: CLINIC | Age: 70
End: 2025-04-10
Payer: MEDICARE

## 2025-04-10 VITALS
SYSTOLIC BLOOD PRESSURE: 128 MMHG | BODY MASS INDEX: 33.47 KG/M2 | HEIGHT: 69 IN | OXYGEN SATURATION: 96 % | HEART RATE: 76 BPM | DIASTOLIC BLOOD PRESSURE: 82 MMHG | WEIGHT: 226 LBS

## 2025-04-10 DIAGNOSIS — I25.110 CORONARY ARTERY DISEASE INVOLVING NATIVE CORONARY ARTERY OF NATIVE HEART WITH UNSTABLE ANGINA PECTORIS: ICD-10-CM

## 2025-04-10 DIAGNOSIS — E87.6 HYPOKALEMIA: ICD-10-CM

## 2025-04-10 DIAGNOSIS — I25.110 CORONARY ARTERY DISEASE INVOLVING NATIVE CORONARY ARTERY OF NATIVE HEART WITH UNSTABLE ANGINA PECTORIS: Primary | ICD-10-CM

## 2025-04-10 DIAGNOSIS — E78.5 HYPERLIPIDEMIA LDL GOAL <100: ICD-10-CM

## 2025-04-10 DIAGNOSIS — I10 ESSENTIAL HYPERTENSION: ICD-10-CM

## 2025-04-10 PROCEDURE — 1160F RVW MEDS BY RX/DR IN RCRD: CPT | Performed by: NURSE PRACTITIONER

## 2025-04-10 PROCEDURE — 1159F MED LIST DOCD IN RCRD: CPT | Performed by: NURSE PRACTITIONER

## 2025-04-10 PROCEDURE — 3074F SYST BP LT 130 MM HG: CPT | Performed by: NURSE PRACTITIONER

## 2025-04-10 PROCEDURE — 99214 OFFICE O/P EST MOD 30 MIN: CPT | Performed by: NURSE PRACTITIONER

## 2025-04-10 PROCEDURE — 3079F DIAST BP 80-89 MM HG: CPT | Performed by: NURSE PRACTITIONER

## 2025-04-10 RX ORDER — TRIAMTERENE AND HYDROCHLOROTHIAZIDE 37.5; 25 MG/1; MG/1
1 CAPSULE ORAL EVERY MORNING
COMMUNITY
Start: 2025-03-17 | End: 2025-04-10

## 2025-04-10 RX ORDER — HYDROCHLOROTHIAZIDE 25 MG/1
25 TABLET ORAL DAILY PRN
Qty: 30 TABLET | Refills: 3 | Status: SHIPPED | OUTPATIENT
Start: 2025-04-10

## 2025-04-10 NOTE — ASSESSMENT & PLAN NOTE
Blood pressure today 122/82.  This is well-controlled.    At his last visit he had reported some dizziness when changing position such as bending over and then standing up quickly.    He had been on triamterene hydrochlorothiazide and I had him discontinue this medication.    He felt like his water output was low off the medications so he did restart but only taking 1 dose every third day.    He is also on metoprolol succinate 50 mg daily.    Plan:    Continue the metoprolol succinate 50 mg daily    Discontinue triamterene hydrochlorothiazide    Ordered a hydrochlorothiazide 25 mg as needed for swelling of the lower extremities or as needed if he feels like he has good urinary output    Lab recheck showing:    Potassium 3.4 slightly low    Creatinine 1.4 very mildly elevated    Discussed that staying off of the triamterene hydrochlorothiazide and only taking a as needed hydrochlorothiazide would help keep his potassium up  and will help keep his kidney function downas he will not be diuresing

## 2025-04-10 NOTE — ASSESSMENT & PLAN NOTE
Noted potassium 3.4 on labs.    Very mildly low.    We discussed that stopping the triamterene hydrochlorothiazide should improve his potassium as he will not be diuresing as much.    He does have hydrochlorothiazide as needed.  He does not plan to take this more than 3 times per week.    He is encouraged on the day that he takes the hydrochlorothiazide then if is to hydrate well with a sugar-free Gatorade.  This will replace his potassium.  He is in agreement.    We can recheck this lab in a few months.

## 2025-04-10 NOTE — PROGRESS NOTES
[facility] Provider Note     Date:   04/10/2025   Name: Stalin Diaz  :   1955  PCP: Tan Payne MD    Chief Complaint   Patient presents with    Coronary Artery Disease       Subjective     History of Present Illness  Stalin Diaz is a 69 y.o. male who presents today for short follow follow-up on new diagnosis of CAD and symptom of shortness of air and fatigue that has been stable and not increasing.    He comes in today and reports he has completed his labs and he like to review those.    He reports that his blood pressure has been running better and he has no longer had any dizzy symptoms.  He reports he did completely stop the triamterene hydrochlorothiazide for about a week but then he felt like his urinary output was not enough so he has been taking 1 every third day.    He reports that his symptom of shortness of air and fatigue has not increased.  He reports that he did do exertional activity since he has seen me last he has mowed his yard twice.  He has been push mowing.  He reports that this activity takes him about 45 minutes.  He reports once he did the whole activity without taking a break and he had no chest pain.  He reports that the last time he did mow for 15 minutes take a short break mode for 15 minutes and took a short break and again mode for about 15 minutes.  He denies any chest pain with these activities.  He does report that he does feel short of air with exertional activity and he has some fatigue but he relates these changes to aging.  He reports it has been increasing slightly over about the last 1 year.    Cardiac history:    Stress testing 2024 - Moderate size area of inferior ischemia note with wall motion abnormalities- Intermediate risk study     Coronary CTA 2025:  IMPRESSION:  1. 31 mm long calcified plaque in proximal and mid LAD.  50-70% stenosis visually (CAD-RADS 3).   Mild calcified plaque in D1.  Mild scattered mixed  plaque scattered in RCA .  Coronary artery calcium score 601.8.  Right dominant circulation  2. LVEF 64%  3. Please refer to the radiology report for information on extra-cardiac structures.       RECOMMENDATION:  1. Medical and lifestyle modifications for extensive but non obstructive coronary artery disease.  Given moderate stenosis in LAD (50-70% stenosis) would suggest CT FFR (I will order) to better assess for obstruction.      CT FFR 2/5/2025 showing:  LAD: Proximal 0.81, mid LAD 0.86  Right coronary artery 0.91  Left circumflex 0.92    ECHO 12/14/2023:  ·  Left ventricular systolic function is normal. Left ventricular ejection fraction appears to be 56 - 60%.  ·  Left ventricular diastolic function is consistent with (grade I) impaired relaxation.  ·  There is mild calcification of the aortic valve mainly affecting the non-coronary and left coronary cusp(s).  ·  Estimated right ventricular systolic pressure from tricuspid regurgitation is normal (<35 mmHg).  ·  Mild dilation of the aortic root is present. Mild dilation of the sinuses of Valsalva is present, 3.9 cm.      Coexisting:hyperlipidemia and hypertension.    History of lung nodule Recent CT of Chest;  11/06/2023: stable pulmonary nodule for > 2 years: no further follow up needed         Reports Denies   Chest Pain [] [x]   Shortness of Air [x] []   Palpitations [] [x]   Edema [] [x]   Dizziness [x] []   Syncope [] [x]       No Known Allergies    Current Outpatient Medications:     aspirin (ASPIR) 81 MG EC tablet, , Disp: , Rfl:     atorvastatin (LIPITOR) 80 MG tablet, Take 1 tablet by mouth Daily., Disp: , Rfl:     clobetasol propionate (TEMOVATE) 0.05 % cream, Apply 1 Application topically to the appropriate area as directed As Needed., Disp: , Rfl:     finasteride (PROSCAR) 5 MG tablet, Take 1 tablet by mouth Daily., Disp: , Rfl:     metoprolol succinate XL (TOPROL-XL) 50 MG 24 hr tablet, Take 1 tablet by mouth Daily., Disp: , Rfl:     nitroglycerin  "(NITROSTAT) 0.4 MG SL tablet, 1 under the tongue as needed for angina, may repeat q5mins for up three doses, Disp: 25 tablet, Rfl: 1    pantoprazole (PROTONIX) 40 MG EC tablet, Take 1 tablet by mouth Every Night., Disp: , Rfl:     Repatha SureClick solution auto-injector SureClick injection, Inject 1 mL under the skin into the appropriate area as directed Every 14 (Fourteen) Days., Disp: , Rfl:     sertraline (ZOLOFT) 100 MG tablet, Take 1 tablet by mouth Every Night., Disp: , Rfl:     tamsulosin (FLOMAX) 0.4 MG capsule 24 hr capsule, Take 1 capsule by mouth 2 (Two) Times a Day., Disp: , Rfl:     Trelegy Ellipta 100-62.5-25 MCG/ACT inhaler, As Needed., Disp: , Rfl:     hydroCHLOROthiazide 25 MG tablet, Take 1 tablet by mouth Daily As Needed (swelling)., Disp: 30 tablet, Rfl: 3    Past Medical History:   Diagnosis Date    Carotid artery stenosis     Chronic kidney disease     Coronary artery disease     Hyperlipidemia     Hypertension     Lung nodules     Retinal detachment     SVT (supraventricular tachycardia)     Thoracic aortic aneurysm       Past Surgical History:   Procedure Laterality Date    CATARACT EXTRACTION      KNEE SURGERY      RETINAL DETACHMENT SURGERY       Family History   Problem Relation Age of Onset    Heart attack Mother     Lung cancer Mother     Kidney failure Father     Crohn's disease Sister     Heart attack Brother      Social History     Socioeconomic History    Marital status:     Number of children: 1   Tobacco Use    Smoking status: Never     Passive exposure: Past    Smokeless tobacco: Never   Vaping Use    Vaping status: Never Used   Substance and Sexual Activity    Alcohol use: Not Currently    Drug use: Never    Sexual activity: Defer       Objective     Vital Signs:  /82 (BP Location: Right arm, Patient Position: Sitting, Cuff Size: Large Adult)   Pulse 76   Ht 175.3 cm (69\")   Wt 103 kg (226 lb)   SpO2 96%   BMI 33.37 kg/m²   Estimated body mass index is 33.37 " "kg/m² as calculated from the following:    Height as of this encounter: 175.3 cm (69\").    Weight as of this encounter: 103 kg (226 lb).             Physical Exam  Constitutional:       Appearance: Normal appearance. He is well-developed.   HENT:      Head: Normocephalic and atraumatic.      Nose: Nose normal.      Mouth/Throat:      Mouth: Mucous membranes are moist.   Eyes:      General: No scleral icterus.     Pupils: Pupils are equal, round, and reactive to light.   Neck:      Vascular: No carotid bruit.   Cardiovascular:      Rate and Rhythm: Normal rate and regular rhythm.      Pulses: Normal pulses.           Radial pulses are 2+ on the right side and 2+ on the left side.        Dorsalis pedis pulses are 2+ on the right side and 2+ on the left side.        Posterior tibial pulses are 2+ on the right side and 2+ on the left side.      Heart sounds: Normal heart sounds. No murmur heard.  Pulmonary:      Effort: Pulmonary effort is normal.      Breath sounds: Normal breath sounds. No wheezing or rhonchi.   Abdominal:      General: Bowel sounds are normal.   Musculoskeletal:      Right lower leg: No edema.      Left lower leg: No edema.   Skin:     General: Skin is warm and dry.      Capillary Refill: Capillary refill takes less than 2 seconds.      Coloration: Skin is not cyanotic.      Nails: There is no clubbing.   Neurological:      Mental Status: He is alert and oriented to person, place, and time.      Motor: No weakness.      Gait: Gait normal.   Psychiatric:         Mood and Affect: Mood normal.         Behavior: Behavior normal. Behavior is cooperative.         Thought Content: Thought content normal.         Cognition and Memory: Memory normal.           Labs reviewed: 4/7/2025 CBC CMP and lipids          Assessment and Plan     Diagnoses and all orders for this visit:    1. Coronary artery disease involving native coronary artery of native heart with unstable angina pectoris (Primary)  Assessment & " Plan:  Recent diagnosis of coronary artery disease in February 2025 seen on coronary CTA with FFR.  He has LAD disease.    He reports that his shortness of air with exertional activity and fatigue that has been present for about a year has not worsened since his last visit.    Reports that he has push mow the yard he completed this in 15-minute increments.  Exertional activity for 15 minutes and then rested briefly and then continued on he reports that he had a total exertional activity of about 45 minutes and he denies any chest pain.  He denies any heaviness feeling in his chest.    He is on medical management and we plan to continue:  -Aspirin  -Metoprolol  -Max statin  -Repatha that he started in February.    He has an as needed nitroglycerin.  He has not needed to use it.    He is reminded again to not resume the Cialis that was discontinued.  He verbalized understanding not to restart.    Again we discussed moving forward with a left heart cath but again today patient reports that since his symptoms are stable that he is unsure that he is willing to move forward.    Again we will just keep him in close follow-up for significant CAD.  He is to report any symptom increase immediately.  And of course nitroglycerin in the ER for any chest pain.  Patient verbalized understanding.        2. Essential hypertension  Assessment & Plan:  Blood pressure today 122/82.  This is well-controlled.    At his last visit he had reported some dizziness when changing position such as bending over and then standing up quickly.    He had been on triamterene hydrochlorothiazide and I had him discontinue this medication.    He felt like his water output was low off the medications so he did restart but only taking 1 dose every third day.    He is also on metoprolol succinate 50 mg daily.    Plan:    Continue the metoprolol succinate 50 mg daily    Discontinue triamterene hydrochlorothiazide    Ordered a hydrochlorothiazide 25 mg as  needed for swelling of the lower extremities or as needed if he feels like he has good urinary output    Lab recheck showing:    Potassium 3.4 slightly low    Creatinine 1.4 very mildly elevated    Discussed that staying off of the triamterene hydrochlorothiazide and only taking a as needed hydrochlorothiazide would help keep his potassium up  and will help keep his kidney function downas he will not be diuresing    Orders:  -     hydroCHLOROthiazide 25 MG tablet; Take 1 tablet by mouth Daily As Needed (swelling).  Dispense: 30 tablet; Refill: 3    3. Hyperlipidemia LDL goal <100  Assessment & Plan:   He has not known coronary artery disease.    He has been on a statin for high cholesterol.    This is his first lab recheck after being on max statin and Repatha.  Labs reviewed in detail showing:    Total cholesterol 72    Triglycerides 117    HDL 40    LDL less than 10          4. Hypokalemia  Assessment & Plan:  Noted potassium 3.4 on labs.    Very mildly low.    We discussed that stopping the triamterene hydrochlorothiazide should improve his potassium as he will not be diuresing as much.    He does have hydrochlorothiazide as needed.  He does not plan to take this more than 3 times per week.    He is encouraged on the day that he takes the hydrochlorothiazide then if is to hydrate well with a sugar-free Gatorade.  This will replace his potassium.  He is in agreement.    We can recheck this lab in a few months.          Recommendations: ER if symptoms increase and Report if any new/changing symptoms immediately          Follow Up  Return in about 2 months (around 6/10/2025) for CAD/HLD .  Patient was given instructions and counseling regarding his condition or for health maintenance advice. Please see specific information pulled into the AVS if appropriate.

## 2025-04-10 NOTE — ASSESSMENT & PLAN NOTE
Recent diagnosis of coronary artery disease in February 2025 seen on coronary CTA with FFR.  He has LAD disease.    He reports that his shortness of air with exertional activity and fatigue that has been present for about a year has not worsened since his last visit.    Reports that he has push mow the yard he completed this in 15-minute increments.  Exertional activity for 15 minutes and then rested briefly and then continued on he reports that he had a total exertional activity of about 45 minutes and he denies any chest pain.  He denies any heaviness feeling in his chest.    He is on medical management and we plan to continue:  -Aspirin  -Metoprolol  -Max statin  -Repatha that he started in February.    He has an as needed nitroglycerin.  He has not needed to use it.    He is reminded again to not resume the Cialis that was discontinued.  He verbalized understanding not to restart.    Again we discussed moving forward with a left heart cath but again today patient reports that since his symptoms are stable that he is unsure that he is willing to move forward.    Again we will just keep him in close follow-up for significant CAD.  He is to report any symptom increase immediately.  And of course nitroglycerin in the ER for any chest pain.  Patient verbalized understanding.

## 2025-04-10 NOTE — ASSESSMENT & PLAN NOTE
He has not known coronary artery disease.    He has been on a statin for high cholesterol.    This is his first lab recheck after being on max statin and Repatha.  Labs reviewed in detail showing:    Total cholesterol 72    Triglycerides 117    HDL 40    LDL less than 10

## 2025-04-17 ENCOUNTER — TELEPHONE (OUTPATIENT)
Age: 70
End: 2025-04-17
Payer: MEDICARE

## 2025-04-17 NOTE — TELEPHONE ENCOUNTER
----- Message from Irais Marion sent at 4/15/2025 12:54 PM EDT -----  Regarding: Cholesterol medications  Let Don know that I talked to Dr. Buck about his very excellent cholesterol lab results after starting Repatha.  She agreed that the Repatha is working wonderfully.  She also thought that we should lower the oral cholesterol medication by half.  I believe that this 80 mg tablet will break in half.  And his prescription will last twice as long.  Or if it will not break in half I can send him in a 40 mg tablet.  Just having check and see.  Good news we can lower the oral cholesterol medication.

## 2025-04-17 NOTE — TELEPHONE ENCOUNTER
Pt called back.  Relayed message.  Pt stated that he would break his 80mg in half.  Pt was advised that is he had any problems to call us back and we'd send the 40mg to pharmacy.  Pt voiced understanding.

## 2025-06-04 ENCOUNTER — OFFICE VISIT (OUTPATIENT)
Dept: CARDIOLOGY | Facility: CLINIC | Age: 70
End: 2025-06-04
Payer: MEDICARE

## 2025-06-04 VITALS
OXYGEN SATURATION: 93 % | SYSTOLIC BLOOD PRESSURE: 134 MMHG | HEIGHT: 69 IN | HEART RATE: 56 BPM | BODY MASS INDEX: 33.62 KG/M2 | WEIGHT: 227 LBS | DIASTOLIC BLOOD PRESSURE: 80 MMHG

## 2025-06-04 DIAGNOSIS — I25.110 CORONARY ARTERY DISEASE INVOLVING NATIVE CORONARY ARTERY OF NATIVE HEART WITH UNSTABLE ANGINA PECTORIS: Primary | ICD-10-CM

## 2025-06-04 DIAGNOSIS — E78.00 PURE HYPERCHOLESTEROLEMIA: ICD-10-CM

## 2025-06-04 DIAGNOSIS — I10 ESSENTIAL HYPERTENSION: ICD-10-CM

## 2025-06-04 PROCEDURE — 99214 OFFICE O/P EST MOD 30 MIN: CPT | Performed by: INTERNAL MEDICINE

## 2025-06-04 PROCEDURE — 3079F DIAST BP 80-89 MM HG: CPT | Performed by: INTERNAL MEDICINE

## 2025-06-04 PROCEDURE — 3075F SYST BP GE 130 - 139MM HG: CPT | Performed by: INTERNAL MEDICINE

## 2025-06-04 RX ORDER — ATORVASTATIN CALCIUM 40 MG/1
40 TABLET, FILM COATED ORAL DAILY
Qty: 90 TABLET | Refills: 1 | Status: SHIPPED | OUTPATIENT
Start: 2025-06-04

## 2025-06-04 NOTE — PROGRESS NOTES
[facility] Provider Note     Date:   2025   Name: Stalin Diaz  :   1955  PCP: Tan Payne MD    Chief Complaint   Patient presents with    Coronary Artery Disease     Pt states he is here for follow up CAD. No chest pain. Does have SOA but only on exertion.        Subjective     History of Present Illness  Stalin Diaz is a 69 y.o. male with CAD who presents today for follow up.   Doing well on repatha  No chest pain or shortness of breath. Mowing yard,etc  Lipids reviwed  Checks BP at home, had been low on triamterene, much better.     Cardiac history:    Stress testing 2024 - Moderate size area of inferior ischemia note with wall motion abnormalities- Intermediate risk study     Coronary CTA 2025:  IMPRESSION:  1. 31 mm long calcified plaque in proximal and mid LAD.  50-70% stenosis visually (CAD-RADS 3).   Mild calcified plaque in D1.  Mild scattered mixed plaque scattered in RCA .  Coronary artery calcium score 601.8.  Right dominant circulation  2. LVEF 64%  3. Please refer to the radiology report for information on extra-cardiac structures.       RECOMMENDATION:  1. Medical and lifestyle modifications for extensive but non obstructive coronary artery disease.  Given moderate stenosis in LAD (50-70% stenosis) would suggest CT FFR (I will order) to better assess for obstruction.      CT FFR 2025 showing:  LAD: Proximal 0.81, mid LAD 0.86  Right coronary artery 0.91  Left circumflex 0.92    ECHO 2023:  ·  Left ventricular systolic function is normal. Left ventricular ejection fraction appears to be 56 - 60%.  ·  Left ventricular diastolic function is consistent with (grade I) impaired relaxation.  ·  There is mild calcification of the aortic valve mainly affecting the non-coronary and left coronary cusp(s).  ·  Estimated right ventricular systolic pressure from tricuspid regurgitation is normal (<35 mmHg).  ·  Mild dilation of the aortic  root is present. Mild dilation of the sinuses of Valsalva is present, 3.9 cm.      Coexisting:hyperlipidemia and hypertension.    History of lung nodule Recent CT of Chest;  11/06/2023: stable pulmonary nodule for > 2 years: no further follow up needed        No Known Allergies    Current Outpatient Medications:     aspirin (ASPIR) 81 MG EC tablet, , Disp: , Rfl:     atorvastatin (LIPITOR) 40 MG tablet, Take 1 tablet by mouth Daily., Disp: 90 tablet, Rfl: 1    clobetasol propionate (TEMOVATE) 0.05 % cream, Apply 1 Application topically to the appropriate area as directed As Needed., Disp: , Rfl:     finasteride (PROSCAR) 5 MG tablet, Take 1 tablet by mouth Daily., Disp: , Rfl:     hydroCHLOROthiazide 25 MG tablet, Take 1 tablet by mouth Daily As Needed (swelling)., Disp: 30 tablet, Rfl: 3    metoprolol succinate XL (TOPROL-XL) 50 MG 24 hr tablet, Take 1 tablet by mouth Daily., Disp: , Rfl:     nitroglycerin (NITROSTAT) 0.4 MG SL tablet, 1 under the tongue as needed for angina, may repeat q5mins for up three doses, Disp: 25 tablet, Rfl: 1    pantoprazole (PROTONIX) 40 MG EC tablet, Take 1 tablet by mouth Every Night., Disp: , Rfl:     Repatha SureClick solution auto-injector SureClick injection, Inject 1 mL under the skin into the appropriate area as directed Every 14 (Fourteen) Days., Disp: , Rfl:     sertraline (ZOLOFT) 100 MG tablet, Take 1 tablet by mouth Every Night., Disp: , Rfl:     tamsulosin (FLOMAX) 0.4 MG capsule 24 hr capsule, Take 1 capsule by mouth 2 (Two) Times a Day., Disp: , Rfl:     Trelegy Ellipta 100-62.5-25 MCG/ACT inhaler, As Needed., Disp: , Rfl:     Past Medical History:   Diagnosis Date    Carotid artery stenosis     Chronic kidney disease     Coronary artery disease     Hyperlipidemia     Hypertension     Lung nodules     Retinal detachment     SVT (supraventricular tachycardia)     Thoracic aortic aneurysm       Past Surgical History:   Procedure Laterality Date    CATARACT EXTRACTION       "KNEE SURGERY      RETINAL DETACHMENT SURGERY       Family History   Problem Relation Age of Onset    Heart attack Mother     Lung cancer Mother     Kidney failure Father     Crohn's disease Sister     Heart attack Brother      Social History     Socioeconomic History    Marital status:     Number of children: 1   Tobacco Use    Smoking status: Never     Passive exposure: Past    Smokeless tobacco: Never   Vaping Use    Vaping status: Never Used   Substance and Sexual Activity    Alcohol use: Not Currently    Drug use: Never    Sexual activity: Defer       Objective     Vital Signs:  /80 (BP Location: Right arm, Patient Position: Sitting, Cuff Size: Adult)   Pulse 56   Ht 175.3 cm (69\")   Wt 103 kg (227 lb)   SpO2 93%   BMI 33.52 kg/m²   Estimated body mass index is 33.52 kg/m² as calculated from the following:    Height as of this encounter: 175.3 cm (69\").    Weight as of this encounter: 103 kg (227 lb).         Physical Exam  Constitutional:       Appearance: Normal appearance. He is well-developed.   HENT:      Head: Normocephalic and atraumatic.   Eyes:      General: No scleral icterus.     Pupils: Pupils are equal, round, and reactive to light.   Cardiovascular:      Rate and Rhythm: Normal rate and regular rhythm.      Heart sounds: Normal heart sounds. No murmur heard.  Pulmonary:      Breath sounds: Normal breath sounds. No wheezing or rhonchi.   Musculoskeletal:      Right lower leg: No edema.      Left lower leg: No edema.   Skin:     Capillary Refill: Capillary refill takes less than 2 seconds.      Coloration: Skin is not cyanotic.      Nails: There is no clubbing.   Neurological:      Mental Status: He is alert and oriented to person, place, and time.      Motor: No weakness.      Gait: Gait normal.   Psychiatric:         Mood and Affect: Mood normal.         Behavior: Behavior is cooperative.         Thought Content: Thought content normal.                     Assessment and Plan "     ASSESSMENTS AND ORDERS  Diagnoses and all orders for this visit:    1. Coronary artery disease involving native coronary artery of native heart with unstable angina pectoris (Primary)  -     atorvastatin (LIPITOR) 40 MG tablet; Take 1 tablet by mouth Daily.  Dispense: 90 tablet; Refill: 1    2. Pure hypercholesterolemia  -     atorvastatin (LIPITOR) 40 MG tablet; Take 1 tablet by mouth Daily.  Dispense: 90 tablet; Refill: 1    3. Essential hypertension             PLAN  -CAD on CTA reviewed, without chest pain or shortness of breath will treat medically for now. Now that he is getting exercise he is not as short of breath as he was over the winter. Will hold off on cath and PCI.   -LDL <10 agree with atorvastatin 40mg.   -plan 6mo OV.   -HTN is well controlled off triamterene.             Follow Up  Return in about 6 months (around 12/4/2025) for Next scheduled follow up.    AKI Buck MD  Cardiology and [facility]  06/04/2025     Please note that this explicitly excludes time spent on other separate billable services such as performing procedures or test interpretation, when applicable.    This note was created using dictation software which occasionally transcribes nonsensical phrases. Please contact the provider if any clarification is needed.